# Patient Record
Sex: MALE | Race: WHITE | Employment: OTHER | URBAN - METROPOLITAN AREA
[De-identification: names, ages, dates, MRNs, and addresses within clinical notes are randomized per-mention and may not be internally consistent; named-entity substitution may affect disease eponyms.]

---

## 2017-01-26 ENCOUNTER — OFFICE VISIT (OUTPATIENT)
Dept: INTERNAL MEDICINE CLINIC | Age: 62
End: 2017-01-26

## 2017-01-26 VITALS
HEART RATE: 54 BPM | DIASTOLIC BLOOD PRESSURE: 83 MMHG | HEIGHT: 70 IN | TEMPERATURE: 97.5 F | OXYGEN SATURATION: 99 % | BODY MASS INDEX: 25.62 KG/M2 | SYSTOLIC BLOOD PRESSURE: 135 MMHG | WEIGHT: 179 LBS | RESPIRATION RATE: 18 BRPM

## 2017-01-26 DIAGNOSIS — J01.00 ACUTE NON-RECURRENT MAXILLARY SINUSITIS: ICD-10-CM

## 2017-01-26 DIAGNOSIS — Z11.59 NEED FOR HEPATITIS C SCREENING TEST: ICD-10-CM

## 2017-01-26 DIAGNOSIS — Z00.00 PREVENTATIVE HEALTH CARE: Primary | ICD-10-CM

## 2017-01-26 DIAGNOSIS — L57.8 SUN-DAMAGED SKIN: ICD-10-CM

## 2017-01-26 DIAGNOSIS — E78.00 PURE HYPERCHOLESTEROLEMIA: ICD-10-CM

## 2017-01-26 RX ORDER — AZITHROMYCIN 250 MG/1
TABLET, FILM COATED ORAL
Qty: 6 TAB | Refills: 0 | Status: SHIPPED | OUTPATIENT
Start: 2017-01-26 | End: 2017-02-07 | Stop reason: ALTCHOICE

## 2017-01-26 NOTE — MR AVS SNAPSHOT
Visit Information Date & Time Provider Department Dept. Phone Encounter #  
 1/26/2017  9:15 AM Lesly Elizabeth MD [facility] [address] [id number] Follow-up Instructions Return in about 1 year (around 1/26/2018). Upcoming Health Maintenance Date Due Hepatitis C Screening 1955 COLONOSCOPY 4/23/1973 ZOSTER VACCINE AGE 60> 4/23/2015 INFLUENZA AGE 9 TO ADULT 8/1/2016 DTaP/Tdap/Td series (2 - Td) 7/3/2023 Allergies as of 1/26/2017  Review Complete On: 1/26/2017 By: Lesly Elizabeth MD  
 No Known Allergies Current Immunizations  Reviewed on 1/26/2017 Name Date Influenza Vaccine 10/1/2016, 9/23/2015, 10/1/2014, 11/4/2013 Influenza Vaccine (Whole Virus) 10/1/2012 Tdap 7/3/2013 Zoster Vaccine, Live 6/1/2016 Reviewed by Lesly Elizabeth MD on 1/26/2017 at  9:43 AM  
 Reviewed by Lesly Elizabeth MD on 1/26/2017 at  9:47 AM  
You Were Diagnosed With   
  
 Codes Comments Preventative health care    -  Primary ICD-10-CM: Z00.00 ICD-9-CM: V70.0 Pure hypercholesterolemia     ICD-10-CM: E78.00 ICD-9-CM: 272.0 Need for hepatitis C screening test     ICD-10-CM: Z11.59 
ICD-9-CM: V73.89 Acute non-recurrent maxillary sinusitis     ICD-10-CM: J01.00 ICD-9-CM: 461.0 Vitals BP Pulse Temp Resp Height(growth percentile) Weight(growth percentile) 135/83 (BP 1 Location: Left arm, BP Patient Position: Sitting) (!) 54 97.5 °F (36.4 °C) (Oral) 18 5' 10\" (1.778 m) 179 lb (81.2 kg) SpO2 BMI Smoking Status 99% 25.68 kg/m2 Former Smoker Vitals History BMI and BSA Data Body Mass Index Body Surface Area  
 25.68 kg/m 2 2 m 2 Preferred Pharmacy Pharmacy Name Phone [id number] [phone] Your Updated Medication List  
  
   
 This list is accurate as of: 17  9:56 AM.  Always use your most recent med list.  
  
  
  
  
 atorvastatin 20 mg tablet Commonly known as:  LIPITOR Take 1 tablet by mouth  nightly  
  
 azithromycin 250 mg tablet Commonly known as:  [place] 2 tabs today then 1 daily for 4 more days CALCIUM-VITAMIN D3 PO Take  by mouth. 2 tablets daily [place] Generic drug:  ceramides 1,3,6-11  
by Apply Externally route. DYMISTA 137-50 mcg/spray [place] Generic drug:  azelastine-fluticasone FISH OIL 1,000 mg Cap Generic drug:  omega-3 fatty acids-vitamin e Take 1 Cap by mouth.  
  
 guaiFENesin 1,200 mg Ta12 ER tablet Commonly known as:  [address] Take  by mouth two (2) times a day. ibuprofen 200 mg tablet Commonly known as:  MOTRIN Take 600 mg by mouth daily as needed for Pain.  
  
 melatonin 5 mg Cap capsule Take 5 mg by mouth. As needed  
  
 montelukast 10 mg tablet Commonly known as:  SINGULAIR Take 1 tablet by mouth  daily MULTIPLE VITAMINS PO Take  by mouth. NexIUM 24HR 20 mg capsule Generic drug:  esomeprazole Take  by mouth daily. VITAMIN C 250 mg tablet Generic drug:  ascorbic acid (vitamin C) Take  by mouth. Prescriptions Sent to Pharmacy Refills  
 azithromycin (ZITHROMAX) 250 mg tablet 0 Si tabs today then 1 daily for 4 more days Class: Normal  
 Pharmacy: [facility]acy #[id number] [place] Ph #: [phone] We Performed the Following HEPATITIS C AB [66427 CPT(R)] LIPID PANEL [47875 CPT(R)] METABOLIC PANEL, BASIC [60954 CPT(R)] PSA DIAGNOSTIC (PROSTATIC SPECIFIC AG) [id number] CPT(R)] Follow-up Instructions Return in about 1 year (around 2018). Patient Instructions Saline Nasal Washes: Care Instructions Your Care Instructions Saline nasal washes help keep the nasal passages open by washing out thick or dried mucus. This simple remedy can help relieve symptoms of allergies, sinusitis, and colds. It also can make the nose feel more comfortable by keeping the mucous membranes moist. You may notice a little burning sensation in your nose the first few times you use the solution, but this usually gets better in a few days. Follow-up care is a key part of your treatment and safety. Be sure to make and go to all appointments, and call your doctor if you are having problems. It's also a good idea to know your test results and keep a list of the medicines you take. How can you care for yourself at home? · You can buy premixed saline solution in a squeeze bottle or other sinus rinse products at a drugstore. Read and follow the instructions on the label. · You also can make your own saline solution by adding 1 teaspoon of salt and 1 teaspoon of baking soda to 2 cups of distilled water. · If you use a homemade solution, pour a small amount into a clean bowl. Using a rubber bulb syringe, squeeze the syringe and place the tip in the salt water. Pull a small amount of the salt water into the syringe by relaxing your hand. · Sit down with your head tilted slightly back. Do not lie down. Put the tip of the bulb syringe or the squeeze bottle a little way into one of your nostrils. Gently drip or squirt a few drops into the nostril. Repeat with the other nostril. Some sneezing and gagging are normal at first. 
· Gently blow your nose. · Wipe the syringe or bottle tip clean after each use. · Repeat this 2 or 3 times a day. · Use nasal washes gently if you have nosebleeds often. When should you call for help? Watch closely for changes in your health, and be sure to contact your doctor if: 
· You often get nosebleeds. · You have problems doing the nasal washes. Where can you learn more? Go to http://josemanuel-cate.info/. Enter [id number] [phone] in the search box to learn more about \"Saline Nasal Washes: Care Instructions. \" Current as of: July 29, 2016 Content Version: 11.1 © 0505-2720 [employer]. Care instructions adapted under license by [employer] (which disclaims liability or warranty for this information). If you have questions about a medical condition or this instruction, always ask your healthcare professional. [place] [address] any warranty or liability for your use of this information. Sinusitis: Care Instructions Your Care Instructions Sinusitis is an infection of the lining of the sinus cavities in your head. Sinusitis often follows a cold. It causes pain and pressure in your head and face. In most cases, sinusitis gets better on its own in 1 to 2 weeks. But some mild symptoms may last for several weeks. Sometimes antibiotics are needed. Follow-up care is a key part of your treatment and safety. Be sure to make and go to all appointments, and call your doctor if you are having problems. It's also a good idea to know your test results and keep a list of the medicines you take. How can you care for yourself at home? · Take an over-the-counter pain medicine, such as acetaminophen (Tylenol), ibuprofen (Advil, Motrin), or naproxen (Aleve). Read and follow all instructions on the label. · If the doctor prescribed antibiotics, take them as directed. Do not stop taking them just because you feel better. You need to take the full course of antibiotics. · Be careful when taking over-the-counter cold or flu medicines and Tylenol at the same time. Many of these medicines have acetaminophen, which is Tylenol. Read the labels to make sure that you are not taking more than the recommended dose. Too much acetaminophen (Tylenol) can be harmful. · Breathe warm, moist air from a steamy shower, a hot bath, or a sink filled with hot water. Avoid cold, dry air.  Using a humidifier in your home may help. Follow the directions for cleaning the machine. · Use saline (saltwater) nasal washes to help keep your nasal passages open and wash out mucus and bacteria. You can buy saline nose drops at a grocery store or drugstore. Or you can make your own at home by adding 1 teaspoon of salt and 1 teaspoon of baking soda to 2 cups of distilled water. If you make your own, fill a bulb syringe with the solution, insert the tip into your nostril, and squeeze gently. Nyoka Mano your nose. · Put a hot, wet towel or a warm gel pack on your face 3 or 4 times a day for 5 to 10 minutes each time. · Try a decongestant nasal spray like oxymetazoline (Afrin). Do not use it for more than 3 days in a row. Using it for more than 3 days can make your congestion worse. When should you call for help? Call your doctor now or seek immediate medical care if: 
· You have new or worse swelling or redness in your face or around your eyes. · You have a new or higher fever. Watch closely for changes in your health, and be sure to contact your doctor if: 
· You have new or worse facial pain. · The mucus from your nose becomes thicker (like pus) or has new blood in it. · You are not getting better as expected. Where can you learn more? Go to http://josemaunel-cate.info/. Enter K178 in the search box to learn more about \"Sinusitis: Care Instructions. \" Current as of: July 29, 2016 Content Version: 11.1 © 3036-1169 [employer]. Care instructions adapted under license by [employer] (which disclaims liability or warranty for this information). If you have questions about a medical condition or this instruction, always ask your healthcare professional. [place] [address] any warranty or liability for your use of this information. Well Visit, Men 48 to 72: Care Instructions Your Care Instructions Physical exams can help you stay healthy.  Your doctor has checked your overall health and may have suggested ways to take good care of yourself. He or she also may have recommended tests. At home, you can help prevent illness with healthy eating, regular exercise, and other steps. Follow-up care is a key part of your treatment and safety. Be sure to make and go to all appointments, and call your doctor if you are having problems. It's also a good idea to know your test results and keep a list of the medicines you take. How can you care for yourself at home? · Reach and stay at a healthy weight. This will lower your risk for many problems, such as obesity, diabetes, heart disease, and high blood pressure. · Get at least 30 minutes of exercise on most days of the week. Walking is a good choice. You also may want to do other activities, such as running, swimming, cycling, or playing tennis or team sports. · Do not smoke. Smoking can make health problems worse. If you need help quitting, talk to your doctor about stop-smoking programs and medicines. These can increase your chances of quitting for good. · Protect your skin from too much sun. When you're outdoors from 10 a.m. to 4 p.m., stay in the shade or cover up with clothing and a hat with a wide brim. Wear sunglasses that block UV rays. Even when it's cloudy, put broad-spectrum sunscreen (SPF 30 or higher) on any exposed skin. · See a dentist one or two times a year for checkups and to have your teeth cleaned. · Wear a seat belt in the car. · Limit alcohol to 2 drinks a day. Too much alcohol can cause health problems. Follow your doctor's advice about when to have certain tests. These tests can spot problems early. · Cholesterol. Your doctor will tell you how often to have this done based on your overall health and other things that can increase your risk for heart attack and stroke. · Blood pressure.  Have your blood pressure checked during a routine doctor visit. Your doctor will tell you how often to check your blood pressure based on your age, your blood pressure results, and other factors. · Prostate exam. Talk to your doctor about whether you should have a blood test (called a PSA test) for prostate cancer. Experts disagree on whether men should have this test. Some experts recommend that you discuss the benefits and risks of the test with your doctor. · Diabetes. Ask your doctor whether you should have tests for diabetes. · Vision. Some experts recommend that you have yearly exams for glaucoma and other age-related eye problems starting at age 48. · Hearing. Tell your doctor if you notice any change in your hearing. You can have tests to find out how well you hear. · Colon cancer. You should begin tests for colon cancer at age 48. You may have one of several tests. Your doctor will tell you how often to have tests based on your age and risk. Risks include whether you already had a precancerous polyp removed from your colon or whether your parent, brother, sister, or child has had colon cancer. · Heart attack and stroke risk. At least every 4 to 6 years, you should have your risk for heart attack and stroke assessed. Your doctor uses factors such as your age, blood pressure, cholesterol, and whether you smoke or have diabetes to show what your risk for a heart attack or stroke is over the next 10 years. · Abdominal aortic aneurysm. Ask your doctor whether you should have a test to check for an aneurysm. You may need a test if you ever smoked or if your parent, brother, sister, or child has had an aneurysm. When should you call for help? Watch closely for changes in your health, and be sure to contact your doctor if you have any problems or symptoms that concern you. Where can you learn more? Go to http://josemanuel-cate.info/. Enter T207 in the search box to learn more about \"Well Visit, Men 48 to 72: Care Instructions. \" 
 Current as of: July 19, 2016 Content Version: 11.1 © 9335-0275 [employer], [employer]. Care instructions adapted under license by [employer] (which disclaims liability or warranty for this information). If you have questions about a medical condition or this instruction, always ask your healthcare professional. [address] any warranty or liability for your use of this information. Introducing [facility]! Dear Vinnie Andrade: 
Thank you for requesting a Giferent account. Our records indicate that you already have an active Giferent account. You can access your account anytime at [email or web address]. Tienda Nube / [email or web address] Did you know that you can access your hospital and ER discharge instructions at any time in Giferent? You can also review all of your test results from your hospital stay or ER visit. Additional Information If you have questions, please visit the Frequently Asked Questions section of the Giferent website at [email or web address] Remember, Giferent is NOT to be used for urgent needs. For medical emergencies, dial 911. Now available from your iPhone and Android! Please provide this summary of care documentation to your next provider. Your primary care clinician is listed as Elizabeth Leo. If you have any questions after today's visit, please call [phone].

## 2017-01-26 NOTE — PROGRESS NOTES
HISTORY OF PRESENT ILLNESS  Lisa Carmen is a 64 y.o. male. HPI  Lisa Carmen is here for complete health maintenance physical exam and screening. he does not have other concerns. Upper respiratory illness:  Lisa Carmen presents with complaints of congestion, sore throat, dry cough, hoarsness, and green nasal discharge for 2 weeks. no nausea and no vomiting . he has not had  fever and chills. Symptoms are mild. Over-the-counter remedies including mucinex   has been used with good relief of symptoms. Drinking plenty of fluids: yes  Asthma?:  no    Contacts with similar infections: yes         Hyperlipidemia:  Lisa Carmen is following up on his dyslipidemia. Cardiovascular risks for him are: LDL goal is under 100. Currently he takes Lipitor (atorvastatin) ,   Lab Results   Component Value Date/Time    Cholesterol, total 158 08/07/2015 08:42 AM    Cholesterol, total 231 07/03/2013 11:48 AM    HDL Cholesterol 66 08/07/2015 08:42 AM    HDL Cholesterol 46 07/03/2013 11:48 AM    LDL, calculated 76 08/07/2015 08:42 AM    LDL, calculated 157 07/03/2013 11:48 AM    Triglyceride 78 08/07/2015 08:42 AM    Triglyceride 139 07/03/2013 11:48 AM    Tryglycerides 111 02/07/2014     Lab Results   Component Value Date/Time    ALT 22 02/07/2014       Myalgias: no  Fatigue: no          Health maintenance hx includes:  Exercise: moderately active.   Form of exercise: elliptical, wts   Diet: generally follows a low fat low cholesterol diet, exercises sporadically    Cancer screening:    Colon cancer screening:  Last Colonoscopy: 2008 and was normal -per pt   Prostate cancer screening: PSA and/or BETO: 2015 and was normal       Lab Results   Component Value Date/Time    Cholesterol, total 158 08/07/2015 08:42 AM    HDL Cholesterol 66 08/07/2015 08:42 AM    LDL, calculated 76 08/07/2015 08:42 AM    VLDL, calculated 16 08/07/2015 08:42 AM    Triglyceride 78 08/07/2015 08:42 AM    Tryglycerides 111 02/07/2014       Lab Results   Component Value Date/Time    Glucose 103 08/07/2015 08:42 AM         Immunizations:     Immunization History   Administered Date(s) Administered    Influenza Vaccine 11/04/2013, 10/01/2014, 09/23/2015, 10/01/2016    Influenza Vaccine (Whole Virus) 10/01/2012    Tdap 07/03/2013    Zoster Vaccine, Live 06/01/2016      Immunization status: up to date and documented. Social History     Social History    Marital status:      Spouse name: N/A    Number of children: N/A    Years of education: N/A     Occupational History    Not on file. Social History Main Topics    Smoking status: Former Smoker     Packs/day: 0.25     Years: 10.00     Types: Cigarettes     Quit date: 8/9/2014    Smokeless tobacco: Never Used      Comment: E Cigarettes     Alcohol use 4.5 oz/week     3 Glasses of wine, 3 Cans of beer, 3 Shots of liquor per week    Drug use: Yes     Special: Marijuana    Sexual activity: Not Currently     Partners: Female     Other Topics Concern    Not on file     Social History Narrative     Past Surgical History   Procedure Laterality Date    Hx orthopaedic       R ring finger fracture, ORIF     Family History   Problem Relation Age of Onset    Elevated Lipids Mother     Hypertension Mother     Heart Disease Father     Dementia Father     Cancer Brother      Hodgkins Lymphoma    Diabetes Maternal Grandmother      Current Outpatient Prescriptions on File Prior to Visit   Medication Sig Dispense Refill    atorvastatin (LIPITOR) 20 mg tablet Take 1 tablet by mouth  nightly 90 Tab 0    guaiFENesin (MUCINEX) 1,200 mg Ta12 ER tablet Take  by mouth two (2) times a day.  0    ascorbic acid (VITAMIN C) 250 mg tablet Take  by mouth.  ceramides 1,3,6-11 (CERAVE) lotn by Apply Externally route.  ibuprofen (MOTRIN) 200 mg tablet Take 600 mg by mouth daily as needed for Pain.  melatonin (MELATONIN) 5 mg cap capsule Take 5 mg by mouth.  As needed      esomeprazole (NEXIUM 24HR) 20 mg capsule Take  by mouth daily.  DYMISTA 137-50 mcg/spray spry       CALCIUM-VITAMIN D3 PO Take  by mouth. 2 tablets daily      MULTIVITAMIN (MULTIPLE VITAMINS PO) Take  by mouth.  omega-3 fatty acids-vitamin e (FISH OIL) 1,000 mg cap Take 1 Cap by mouth.  montelukast (SINGULAIR) 10 mg tablet Take 1 tablet by mouth  daily 90 Tab 0     No current facility-administered medications on file prior to visit. .    Review of Systems   Constitutional: Negative for chills, fever, malaise/fatigue and weight loss. HENT: Positive for congestion. Negative for sore throat. Eyes: Negative for blurred vision and pain. Respiratory: Positive for cough. Negative for shortness of breath and wheezing. Cardiovascular: Negative for chest pain, palpitations and leg swelling. Gastrointestinal: Negative for constipation, diarrhea, heartburn, nausea and vomiting. Genitourinary: Negative for dysuria and hematuria. Musculoskeletal: Negative for back pain, joint pain and myalgias. Skin: Negative for rash. Neurological: Positive for headaches. Negative for dizziness. Psychiatric/Behavioral: Negative for depression. The patient is not nervous/anxious. Physical Exam   Constitutional: He is oriented to person, place, and time. He appears well-developed and well-nourished. No distress. /83 (BP 1 Location: Left arm, BP Patient Position: Sitting)  Pulse (!) 54  Temp 97.5 °F (36.4 °C) (Oral)   Resp 18  Ht 5' 10\" (1.778 m)  Wt 179 lb (81.2 kg)  SpO2 99%  BMI 25.68 kg/m2   HENT:   Head: Normocephalic and atraumatic. Right Ear: Hearing, tympanic membrane and ear canal normal. No decreased hearing is noted. Left Ear: Hearing, tympanic membrane and ear canal normal. No decreased hearing is noted. Nose: Mucosal edema and rhinorrhea present. No epistaxis. Right sinus exhibits no maxillary sinus tenderness and no frontal sinus tenderness.  Left sinus exhibits no maxillary sinus tenderness and no frontal sinus tenderness. Mouth/Throat: Uvula is midline and mucous membranes are normal. No oral lesions. Normal dentition. Posterior oropharyngeal erythema present. No oropharyngeal exudate, posterior oropharyngeal edema or tonsillar abscesses. Eyes: Conjunctivae and lids are normal. Pupils are equal, round, and reactive to light. Right eye exhibits no discharge. Left eye exhibits no discharge. No scleral icterus. Neck: Trachea normal. Neck supple. No thyromegaly present. Cardiovascular: Normal rate, regular rhythm, normal heart sounds, intact distal pulses and normal pulses. Exam reveals no gallop and no friction rub. No murmur heard. Pulmonary/Chest: Effort normal and breath sounds normal. No respiratory distress. Abdominal: Soft. Normal appearance and bowel sounds are normal. He exhibits no distension and no mass. There is no hepatosplenomegaly. There is no tenderness. There is no CVA tenderness. Musculoskeletal: Normal range of motion. He exhibits no edema or tenderness. Lymphadenopathy:     He has no cervical adenopathy. Right: No inguinal adenopathy present. Left: No inguinal adenopathy present. Neurological: He is alert and oriented to person, place, and time. Skin: Skin is warm and dry. No rash noted. He is not diaphoretic. Scattered erythematous patches c/w actinic keratosis on face, neck   Psychiatric: He has a normal mood and affect. His speech is normal and behavior is normal. Judgment and thought content normal. Cognition and memory are normal.   Nursing note and vitals reviewed. ASSESSMENT and PLAN  Anika Hansen was seen today for well male.     Diagnoses and all orders for this visit:    [facility]  he was advised to have follow up colonoscopy in 2018  Jojo Lai was counseled on age-appropriate/ guideline-based risk prevention behaviors and screening for a 64 y.o. year old   male . We also discussed adjustments in screening based on family history if necessary. Printed instructions for preventative screening guidelines and healthy behaviors given to patient with after visit summary. Pure hypercholesterolemia - recheck  -     LIPID PANEL    Need for hepatitis C screening test  -     HEPATITIS C AB    Acute non-recurrent maxillary sinusitis  Halle Duran was diagnosed with sinusitis. he is advised to drink plenty of water, use shower steam or humidifier to loosen secretions, saline nasal lavage 3 times daily and get plenty of rest.  he may use mucinex 1200mg twice daily, afrin nasal spray (2 sprays each nostril twice daily for up to 5 days) along with tylenol or advil as needed for fever and pain. Written instructions were given to the patient emphasizing these recommendations. -     azithromycin (ZITHROMAX) 250 mg tablet; 2 tabs today then 1 daily for 4 more days    Sun-damaged skin  -     REFERRAL TO DERMATOLOGY      Follow-up Disposition:  Return in about 1 year (around 1/26/2018).

## 2017-01-26 NOTE — PATIENT INSTRUCTIONS
Saline Nasal Washes: Care Instructions  Your Care Instructions  Saline nasal washes help keep the nasal passages open by washing out thick or dried mucus. This simple remedy can help relieve symptoms of allergies, sinusitis, and colds. It also can make the nose feel more comfortable by keeping the mucous membranes moist. You may notice a little burning sensation in your nose the first few times you use the solution, but this usually gets better in a few days. Follow-up care is a key part of your treatment and safety. Be sure to make and go to all appointments, and call your doctor if you are having problems. It's also a good idea to know your test results and keep a list of the medicines you take. How can you care for yourself at home? · You can buy premixed saline solution in a squeeze bottle or other sinus rinse products at a drugstore. Read and follow the instructions on the label. · You also can make your own saline solution by adding 1 teaspoon of salt and 1 teaspoon of baking soda to 2 cups of distilled water. · If you use a homemade solution, pour a small amount into a clean bowl. Using a rubber bulb syringe, squeeze the syringe and place the tip in the salt water. Pull a small amount of the salt water into the syringe by relaxing your hand. · Sit down with your head tilted slightly back. Do not lie down. Put the tip of the bulb syringe or the squeeze bottle a little way into one of your nostrils. Gently drip or squirt a few drops into the nostril. Repeat with the other nostril. Some sneezing and gagging are normal at first.  · Gently blow your nose. · Wipe the syringe or bottle tip clean after each use. · Repeat this 2 or 3 times a day. · Use nasal washes gently if you have nosebleeds often. When should you call for help? Watch closely for changes in your health, and be sure to contact your doctor if:  · You often get nosebleeds. · You have problems doing the nasal washes.   Where can you learn more? Go to http://josemanuel-cate.info/. Enter [id number] in the search box to learn more about \"Saline Nasal Washes: Care Instructions. \"  Current as of: July 29, 2016  Content Version: 11.1  © 4893-0285 [employer]. Care instructions adapted under license by [employer] (which disclaims liability or warranty for this information). If you have questions about a medical condition or this instruction, always ask your healthcare professional. [address] any warranty or liability for your use of this information. Sinusitis: Care Instructions  Your Care Instructions    Sinusitis is an infection of the lining of the sinus cavities in your head. Sinusitis often follows a cold. It causes pain and pressure in your head and face. In most cases, sinusitis gets better on its own in 1 to 2 weeks. But some mild symptoms may last for several weeks. Sometimes antibiotics are needed. Follow-up care is a key part of your treatment and safety. Be sure to make and go to all appointments, and call your doctor if you are having problems. It's also a good idea to know your test results and keep a list of the medicines you take. How can you care for yourself at home? · Take an over-the-counter pain medicine, such as acetaminophen (Tylenol), ibuprofen (Advil, Motrin), or naproxen (Aleve). Read and follow all instructions on the label. · If the doctor prescribed antibiotics, take them as directed. Do not stop taking them just because you feel better. You need to take the full course of antibiotics. · Be careful when taking over-the-counter cold or flu medicines and Tylenol at the same time. Many of these medicines have acetaminophen, which is Tylenol. Read the labels to make sure that you are not taking more than the recommended dose. Too much acetaminophen (Tylenol) can be harmful.   · Breathe warm, moist air from a steamy shower, a hot bath, or a sink filled with hot water. Avoid cold, dry air. Using a humidifier in your home may help. Follow the directions for cleaning the machine. · Use saline (saltwater) nasal washes to help keep your nasal passages open and wash out mucus and bacteria. You can buy saline nose drops at a grocery store or drugstore. Or you can make your own at home by adding 1 teaspoon of salt and 1 teaspoon of baking soda to 2 cups of distilled water. If you make your own, fill a bulb syringe with the solution, insert the tip into your nostril, and squeeze gently. Tabatha Pickup your nose. · Put a hot, wet towel or a warm gel pack on your face 3 or 4 times a day for 5 to 10 minutes each time. · Try a decongestant nasal spray like oxymetazoline (Afrin). Do not use it for more than 3 days in a row. Using it for more than 3 days can make your congestion worse. When should you call for help? Call your doctor now or seek immediate medical care if:  · You have new or worse swelling or redness in your face or around your eyes. · You have a new or higher fever. Watch closely for changes in your health, and be sure to contact your doctor if:  · You have new or worse facial pain. · The mucus from your nose becomes thicker (like pus) or has new blood in it. · You are not getting better as expected. Where can you learn more? Go to http://josemanuel-cate.info/. Enter U904 in the search box to learn more about \"Sinusitis: Care Instructions. \"  Current as of: July 29, 2016  Content Version: 11.1  © 1403-7187 [employer]. Care instructions adapted under license by [employer] (which disclaims liability or warranty for this information). If you have questions about a medical condition or this instruction, always ask your healthcare professional. [place] [address] any warranty or liability for your use of this information.        Well Visit, Men 48 to 72: Care Instructions  Your Care Instructions  Physical exams can help you stay healthy. Your doctor has checked your overall health and may have suggested ways to take good care of yourself. He or she also may have recommended tests. At home, you can help prevent illness with healthy eating, regular exercise, and other steps. Follow-up care is a key part of your treatment and safety. Be sure to make and go to all appointments, and call your doctor if you are having problems. It's also a good idea to know your test results and keep a list of the medicines you take. How can you care for yourself at home? · Reach and stay at a healthy weight. This will lower your risk for many problems, such as obesity, diabetes, heart disease, and high blood pressure. · Get at least 30 minutes of exercise on most days of the week. Walking is a good choice. You also may want to do other activities, such as running, swimming, cycling, or playing tennis or team sports. · Do not smoke. Smoking can make health problems worse. If you need help quitting, talk to your doctor about stop-smoking programs and medicines. These can increase your chances of quitting for good. · Protect your skin from too much sun. When you're outdoors from 10 a.m. to 4 p.m., stay in the shade or cover up with clothing and a hat with a wide brim. Wear sunglasses that block UV rays. Even when it's cloudy, put broad-spectrum sunscreen (SPF 30 or higher) on any exposed skin. · See a dentist one or two times a year for checkups and to have your teeth cleaned. · Wear a seat belt in the car. · Limit alcohol to 2 drinks a day. Too much alcohol can cause health problems. Follow your doctor's advice about when to have certain tests. These tests can spot problems early. · Cholesterol. Your doctor will tell you how often to have this done based on your overall health and other things that can increase your risk for heart attack and stroke. · Blood pressure. Have your blood pressure checked during a routine doctor visit.  Your doctor will tell you how often to check your blood pressure based on your age, your blood pressure results, and other factors. · Prostate exam. Talk to your doctor about whether you should have a blood test (called a PSA test) for prostate cancer. Experts disagree on whether men should have this test. Some experts recommend that you discuss the benefits and risks of the test with your doctor. · Diabetes. Ask your doctor whether you should have tests for diabetes. · Vision. Some experts recommend that you have yearly exams for glaucoma and other age-related eye problems starting at age 48. · Hearing. Tell your doctor if you notice any change in your hearing. You can have tests to find out how well you hear. · Colon cancer. You should begin tests for colon cancer at age 48. You may have one of several tests. Your doctor will tell you how often to have tests based on your age and risk. Risks include whether you already had a precancerous polyp removed from your colon or whether your parent, brother, sister, or child has had colon cancer. · Heart attack and stroke risk. At least every 4 to 6 years, you should have your risk for heart attack and stroke assessed. Your doctor uses factors such as your age, blood pressure, cholesterol, and whether you smoke or have diabetes to show what your risk for a heart attack or stroke is over the next 10 years. · Abdominal aortic aneurysm. Ask your doctor whether you should have a test to check for an aneurysm. You may need a test if you ever smoked or if your parent, brother, sister, or child has had an aneurysm. When should you call for help? Watch closely for changes in your health, and be sure to contact your doctor if you have any problems or symptoms that concern you. Where can you learn more? Go to http://josemanuel-cate.info/. Enter C978 in the search box to learn more about \"Well Visit, Men 48 to 72: Care Instructions. \"  Current as of: July 19, 2016  Content Version: 11.1  © 8375-0776 [employer]. Care instructions adapted under license by [employer] (which disclaims liability or warranty for this information). If you have questions about a medical condition or this instruction, always ask your healthcare professional. [address] any warranty or liability for your use of this information.

## 2017-01-27 LAB
BUN SERPL-MCNC: 15 MG/DL (ref 8–27)
BUN/CREAT SERPL: 14 (ref 10–22)
CALCIUM SERPL-MCNC: 9.5 MG/DL (ref 8.6–10.2)
CHLORIDE SERPL-SCNC: 96 MMOL/L (ref 96–106)
CHOLEST SERPL-MCNC: 141 MG/DL (ref 100–199)
CO2 SERPL-SCNC: 26 MMOL/L (ref 18–29)
CREAT SERPL-MCNC: 1.07 MG/DL (ref 0.76–1.27)
GLUCOSE SERPL-MCNC: 94 MG/DL (ref 65–99)
HCV AB S/CO SERPL IA: <0.1 S/CO RATIO (ref 0–0.9)
HDLC SERPL-MCNC: 60 MG/DL
INTERPRETATION, 910389: NORMAL
LDLC SERPL CALC-MCNC: 69 MG/DL (ref 0–99)
POTASSIUM SERPL-SCNC: 4.6 MMOL/L (ref 3.5–5.2)
PSA SERPL-MCNC: 3 NG/ML (ref 0–4)
SODIUM SERPL-SCNC: 136 MMOL/L (ref 134–144)
TRIGL SERPL-MCNC: 62 MG/DL (ref 0–149)
VLDLC SERPL CALC-MCNC: 12 MG/DL (ref 5–40)

## 2017-02-07 ENCOUNTER — TELEPHONE (OUTPATIENT)
Dept: INTERNAL MEDICINE CLINIC | Age: 62
End: 2017-02-07

## 2017-02-07 RX ORDER — LEVOFLOXACIN 500 MG/1
500 TABLET, FILM COATED ORAL DAILY
Qty: 10 TAB | Refills: 0 | Status: SHIPPED | OUTPATIENT
Start: 2017-02-07 | End: 2017-06-27 | Stop reason: ALTCHOICE

## 2017-02-07 NOTE — TELEPHONE ENCOUNTER
Ongoing sinusitis. Will change to levaquin 500mg daily for 10 days. New medication or Refill was escribed to patient's pharmacy as requested. Please let him know. Follow up here if not improving with that.

## 2017-02-07 NOTE — TELEPHONE ENCOUNTER
Patient informed of new prescription that was sent to local pharmacy. Patient is to let us know if not better in 12 days.

## 2017-02-07 NOTE — TELEPHONE ENCOUNTER
----- Message from Nya Berry LPN sent at 9/2/9647 12:26 PM EST -----  Regarding: FW: Visit Follow-Up Question  Contact: [phone]  Refill/new prescription request.    ----- Message -----     From: Terrence Mahan     Sent: 2/7/2017  10:45 AM       To: [facility]  Subject: Visit Follow-Up Question                         Dr. Jessica Jacob prescribed the 5-day  Z-pac for me at my last visit on January 26th since I had head and chest congestion for over two weeks, symptoms started on January 11th. I took the last of the five Z-pac pills on January 30th. As of today, over a week sine the last pill, I still have the head and chest congestion. Is there another prescription I could have to knock this thing out, perhaps another dose of the z-pac? .  I have now had this for almost 4 weeks.       Thanks

## 2017-02-24 ENCOUNTER — TELEPHONE (OUTPATIENT)
Dept: INTERNAL MEDICINE CLINIC | Age: 62
End: 2017-02-24

## 2017-02-24 RX ORDER — SILDENAFIL 50 MG/1
50 TABLET, FILM COATED ORAL AS NEEDED
Qty: 8 TAB | Refills: 5 | Status: SHIPPED | OUTPATIENT
Start: 2017-02-24 | End: 2017-06-14 | Stop reason: SDUPTHER

## 2017-02-24 NOTE — TELEPHONE ENCOUNTER
I have attempted without success to contact this patient by phone to return their call and I left a message on answering machine.

## 2017-02-24 NOTE — TELEPHONE ENCOUNTER
----- Message from Magi James LPN sent at 0/57/0559  7:33 AM EST -----  Regarding: FW: Visit Follow-Up Question  Contact: [phone]  Patient requesting a new medication.   ----- Message -----     From: Chapo Patel     Sent: 2/23/2017   4:10 PM       To: [facility]  Subject: Visit [facility] Dr. Mireya Dumont,    I had two items to mention;    First, I finished the last of the second round of antibiotics on Saturday, February 18th. I was instructed to contact you two days after to report on how I am doing. I do feel somewhat better although I still have some congestion in my ears and my eyes are puffy. I have continued to use the Nasal Rinse and Afrin twice daily. What do you recommend at this point? Second and more awkwardly and embarrassingly, I meant to mention an additional item this to Dr. Mireya Dumont at my annual exam.  It has been 28 years since I have been in the dating scene and I think I may need a little assistance in the form of low-dose Viagra. Let me know what you think and if OK could you send to the my pharmacy.

## 2017-02-24 NOTE — TELEPHONE ENCOUNTER
I spoke to pt. Nasal congestion, clear drainage improving but not resolved. The patient denies fevers, chills or sweats. No cough. Can change zyrtec to zyrtec D for better decongestion. Continue flonase, singulair as this is  Likely allergy related. Erectile Dysfunction:  Floyd Duran reports sexual dysfunction developing over recently after returning to sexual activity . He reports difficulty maintaining erections with intercourse. His libido is Normal. He has had a history of testicular trauma, surgery or infections. PDE5 inhibitor has not been tried. He does wish to try medication for this. Will try viagra. The patient desires Viagra to treat his erectile dysfunction. History and physical exam has not disclosed any other obvious treatable cause of this complaint. He is informed that Viagra is sometimes not covered by insurance. It is available on a fee-for-service cost basis, and is relatively expensive. He can start with 50 mg dose, and increase to 100 mg if necessary. The method of use 15-30 minutes prior to anticipated intercourse is explained. He should not use any more than one tablet in a 24 hour period. The patient is not taking nitrates, and denies he has access to nitrates in any form at any time. The side effects of possible headache, flushing, dyspepsia and transient changes in vision have been explained. Rarely prolonged painful erection can occur and he was told to go to ER immediately if that were to occur.

## 2017-02-24 NOTE — TELEPHONE ENCOUNTER
----- Message from Maria Alejandra Flood LPN sent at 9/59/3743  7:33 AM EST -----  Regarding: FW: Visit Follow-Up Question  Contact: [phone]  Patient requesting a new medication.   ----- Message -----     From: Valeriy Fortune     Sent: 2/23/2017   4:10 PM       To: [facility]  Subject: Visit [facility] Dr. Nuris Becker,    I had two items to mention;    First, I finished the last of the second round of antibiotics on Saturday, February 18th. I was instructed to contact you two days after to report on how I am doing. I do feel somewhat better although I still have some congestion in my ears and my eyes are puffy. I have continued to use the Nasal Rinse and Afrin twice daily. What do you recommend at this point? Second and more awkwardly and embarrassingly, I meant to mention an additional item this to Dr. Nuris Becker at my annual exam.  It has been 28 years since I have been in the dating scene and I think I may need a little assistance in the form of low-dose Viagra. Let me know what you think and if OK could you send to the my pharmacy.

## 2017-06-19 RX ORDER — SILDENAFIL 50 MG/1
20 TABLET, FILM COATED ORAL AS NEEDED
Qty: 30 TAB | Refills: 1 | Status: CANCELLED | OUTPATIENT
Start: 2017-06-19

## 2017-06-19 NOTE — TELEPHONE ENCOUNTER
[facility] requires a new Rx on file for Sildenafil 20 mg. Patient  script with a coupon card and paid out of packet for 90 tablets.  [facility] # [id number] contact [phone]

## 2017-06-21 NOTE — TELEPHONE ENCOUNTER
----- Message from Vangie Severin sent at 6/21/2017  1:20 PM EDT -----  Regarding: Dr. Vitale/prescription request  Pt needs the prescription for Sildenafil changed to 20mg quantity of 90 tablets supply. Please all the prescription into the [facility] pharmacy [phone]. Pt can be reached at [phone].

## 2017-06-22 RX ORDER — SILDENAFIL 50 MG/1
50 TABLET, FILM COATED ORAL AS NEEDED
Qty: 90 TAB | Refills: 0 | Status: CANCELLED | OUTPATIENT
Start: 2017-06-22

## 2017-06-23 RX ORDER — SILDENAFIL CITRATE 20 MG/1
60 TABLET ORAL
Qty: 90 TAB | Refills: 2 | Status: SHIPPED | OUTPATIENT
Start: 2017-06-23

## 2017-06-27 ENCOUNTER — OFFICE VISIT (OUTPATIENT)
Dept: INTERNAL MEDICINE CLINIC | Age: 62
End: 2017-06-27

## 2017-06-27 VITALS
WEIGHT: 177 LBS | RESPIRATION RATE: 16 BRPM | OXYGEN SATURATION: 98 % | HEIGHT: 70 IN | DIASTOLIC BLOOD PRESSURE: 87 MMHG | SYSTOLIC BLOOD PRESSURE: 154 MMHG | BODY MASS INDEX: 25.34 KG/M2 | TEMPERATURE: 98.2 F | HEART RATE: 64 BPM

## 2017-06-27 DIAGNOSIS — B00.1 FEVER BLISTER: Primary | ICD-10-CM

## 2017-06-27 DIAGNOSIS — Z11.3 SCREENING FOR STD (SEXUALLY TRANSMITTED DISEASE): ICD-10-CM

## 2017-06-27 DIAGNOSIS — B00.1 FEVER BLISTER: ICD-10-CM

## 2017-06-27 RX ORDER — SILDENAFIL CITRATE 50 MG/1
TABLET, FILM COATED ORAL
COMMUNITY
Start: 2017-06-17 | End: 2017-06-27 | Stop reason: SDUPTHER

## 2017-06-27 RX ORDER — LANOLIN ALCOHOL/MO/W.PET/CERES
1000 CREAM (GRAM) TOPICAL DAILY
COMMUNITY
End: 2022-07-26 | Stop reason: ALTCHOICE

## 2017-06-27 RX ORDER — VALACYCLOVIR HYDROCHLORIDE 1 G/1
1000 TABLET, FILM COATED ORAL 2 TIMES DAILY
Qty: 6 TAB | Refills: 0 | Status: SHIPPED | OUTPATIENT
Start: 2017-06-27 | End: 2017-06-29 | Stop reason: SDUPTHER

## 2017-06-27 RX ORDER — ZINC GLUCONATE 10 MG
LOZENGE ORAL
COMMUNITY
End: 2022-07-26 | Stop reason: ALTCHOICE

## 2017-06-27 NOTE — MR AVS SNAPSHOT
Visit Information Date & Time Provider Department Dept. Phone Encounter #  
 6/27/2017  3:00 PM Mattie Aleman MD [facility] [address] [id number] Upcoming Health Maintenance Date Due COLONOSCOPY 4/23/1973 INFLUENZA AGE 9 TO ADULT 8/1/2017 DTaP/Tdap/Td series (2 - Td) 7/3/2023 Allergies as of 6/27/2017  Review Complete On: 5/56/9188 By: Aurelia Lace Wears No Known Allergies Current Immunizations  Reviewed on 1/26/2017 Name Date Influenza Vaccine 10/1/2016, 9/23/2015, 10/1/2014, 11/4/2013 Influenza Vaccine (Whole Virus) 10/1/2012 Tdap 7/3/2013 Zoster Vaccine, Live 6/1/2016 Not reviewed this visit You Were Diagnosed With   
  
 Codes Comments Fever blister    -  Primary ICD-10-CM: B00.1 ICD-9-CM: 054.9 Screening for STD (sexually transmitted disease)     ICD-10-CM: Z11.3 ICD-9-CM: V74.5 Vitals BP Pulse Temp Resp Height(growth percentile) Weight(growth percentile) 154/87 (BP 1 Location: Left arm, BP Patient Position: Sitting) 64 98.2 °F (36.8 °C) (Oral) 16 5' 10\" (1.778 m) 177 lb (80.3 kg) SpO2 BMI Smoking Status 98% 25.4 kg/m2 Former Smoker Vitals History BMI and BSA Data Body Mass Index Body Surface Area  
 25.4 kg/m 2 1.99 m 2 Preferred Pharmacy Pharmacy Name Phone [id number]- [place], [address] [phone] Your Updated Medication List  
  
   
This list is accurate as of: 6/27/17  3:38 PM.  Always use your most recent med list.  
  
  
  
  
 atorvastatin 20 mg tablet Commonly known as:  LIPITOR Take 1 tablet by mouth  nightly CALCIUM-VITAMIN D3 PO Take  by mouth. 2 tablets daily Limmie Harlan Generic drug:  ceramides 1,3,6-11  
by Apply Externally route. cyanocobalamin 1,000 mcg tablet Take 1,000 mcg by mouth daily. DYMISTA 137-50 mcg/spray Spry Generic drug:  azelastine-fluticasone FISH OIL 1,000 mg Cap Generic drug:  omega-3 fatty acids-vitamin e Take 1 Cap by mouth.  
  
 guaiFENesin 1,200 mg Ta12 ER tablet Commonly known as:  [address] Take  by mouth two (2) times a day. ibuprofen 200 mg tablet Commonly known as:  MOTRIN Take 600 mg by mouth daily as needed for Pain.  
  
 levoFLOXacin 500 mg tablet Commonly known as:  Marrianne Ogles Take 1 Tab by mouth daily. magnesium 250 mg Tab Take  by mouth.  
  
 melatonin 5 mg Cap capsule Take 5 mg by mouth. As needed  
  
 montelukast 10 mg tablet Commonly known as:  SINGULAIR Take 1 tablet by mouth  daily MULTIPLE VITAMINS PO Take  by mouth. NexIUM 24HR 20 mg capsule Generic drug:  esomeprazole Take  by mouth daily. sildenafil 20 mg tablet Commonly known as:  REVATIO Take 3 Tabs by mouth daily as needed. valACYclovir 1 gram tablet Commonly known as:  VALTREX Take 1 Tab by mouth two (2) times a day for 3 days. VITAMIN C 250 mg tablet Generic drug:  ascorbic acid (vitamin C) Take  by mouth. Prescriptions Sent to Pharmacy Refills  
 valACYclovir (VALTREX) 1 gram tablet 0 Sig: Take 1 Tab by mouth two (2) times a day for 3 days. Class: Normal  
 Pharmacy: [facility]/pharmacy #[id number] Ph #: [phone] Route: Oral  
  
We Performed the Following HIV 1/2 AG/AB, 4TH GENERATION,W RFLX CONFIRM [[id number] Custom] HSV 1 AND 2 ABS, IGM [[id number] Custom] RPR [30176 CPT(R)] To-Do List   
 06/27/2017 Lab:  HERPES SIMPLEX VIRUS TYPES 1/2 SPECIFIC AB, IGG Introducing [facility]! Dear Min Short: 
Thank you for requesting a [employer] account. Our records indicate that you already have an active [employer] account. You can access your account anytime at [email or web address] Did you know that you can access your hospital and ER discharge instructions at any time in [employer]? You can also review all of your test results from your hospital stay or ER visit. Additional Information If you have questions, please visit the Frequently Asked Questions section of the [employer] website at [email or web address]. Pure life renal/[employer]/. Remember, [employer] is NOT to be used for urgent needs. For medical emergencies, dial 911. Now available from your iPhone and Android! Please provide this summary of care documentation to your next provider. Your primary care clinician is listed as Kayleigh Abbott. If you have any questions after today's visit, please call [phone].

## 2017-06-27 NOTE — PROGRESS NOTES
Ricardo Whipple is a 58 y.o. male who presents today for Mouth Lesions  . He has a history of   Patient Active Problem List   Diagnosis Code    GERD (gastroesophageal reflux disease) K21.9    Tinnitus, bilateral H93.13    Low back pain M54.5    Hyperlipidemia E78.5    Prediabetes R73.03   . Today patient is here for an acute visit. Problem visit:  Ricardo Whipple is here for complaint of lesion on mouth and R side of face. Problem began 1 week(s) ago. Severity is moderate  Character of problem: rash on the R side face/lip. Pt notes recently  that had sexual relations with a new partner and provided oral sex. This was a week before the outbreak. No fevers/chills. No genital lesions. Associated symptoms include: mild dyscomfort, notes ? A small lymph node. Treatments tried include: abx ointment. ROS  Review of Systems   Constitutional: Negative for chills, fever and weight loss. HENT: Negative for congestion and sore throat. Eyes: Negative for blurred vision, double vision and photophobia. Respiratory: Negative for cough and shortness of breath. Cardiovascular: Negative for chest pain, palpitations and leg swelling. Gastrointestinal: Negative for abdominal pain, constipation, diarrhea, heartburn, nausea and vomiting. Genitourinary: Negative for dysuria, frequency, hematuria and urgency. Musculoskeletal: Negative for joint pain and myalgias. Skin: Positive for rash. Neurological: Negative. Negative for headaches. Endo/Heme/Allergies: Does not bruise/bleed easily. Psychiatric/Behavioral: Negative for memory loss and suicidal ideas. Visit Vitals    /87 (BP 1 Location: Left arm, BP Patient Position: Sitting)    Pulse 64    Temp 98.2 °F (36.8 °C) (Oral)    Resp 16    Ht 5' 10\" (1.778 m)    Wt 177 lb (80.3 kg)    SpO2 98%    BMI 25.4 kg/m2       Physical Exam   Constitutional: He is oriented to person, place, and time.  He appears well-developed and well-nourished. HENT:   Head: Normocephalic and atraumatic. Small lesions. Consistent with herpes lesions. Mouth clear. No drainage. Cardiovascular: Normal rate and regular rhythm. Pulmonary/Chest: Effort normal. No respiratory distress. Neurological: He is alert and oriented to person, place, and time. Skin: Skin is warm and dry. Psychiatric: He has a normal mood and affect. Slightly anxious         Current Outpatient Prescriptions   Medication Sig    magnesium 250 mg tab Take  by mouth.  cyanocobalamin 1,000 mcg tablet Take 1,000 mcg by mouth daily.  valACYclovir (VALTREX) 1 gram tablet Take 1 Tab by mouth two (2) times a day for 3 days.  sildenafil (REVATIO) 20 mg tablet Take 3 Tabs by mouth daily as needed.  atorvastatin (LIPITOR) 20 mg tablet Take 1 tablet by mouth  nightly    montelukast (SINGULAIR) 10 mg tablet Take 1 tablet by mouth  daily    ascorbic acid (VITAMIN C) 250 mg tablet Take  by mouth.  ibuprofen (MOTRIN) 200 mg tablet Take 600 mg by mouth daily as needed for Pain.  esomeprazole (NEXIUM 24HR) 20 mg capsule Take  by mouth daily.  DYMISTA 137-50 mcg/spray spry     CALCIUM-VITAMIN D3 PO Take  by mouth. 2 tablets daily    MULTIVITAMIN (MULTIPLE VITAMINS PO) Take  by mouth.  omega-3 fatty acids-vitamin e (FISH OIL) 1,000 mg cap Take 1 Cap by mouth.  levoFLOXacin (LEVAQUIN) 500 mg tablet Take 1 Tab by mouth daily.  guaiFENesin (MUCINEX) 1,200 mg Ta12 ER tablet Take  by mouth two (2) times a day.  ceramides 1,3,6-11 (CERAVE) lotn by Apply Externally route.  melatonin (MELATONIN) 5 mg cap capsule Take 5 mg by mouth. As needed     No current facility-administered medications for this visit.          Past Medical History:   Diagnosis Date    Calculus of kidney     Chronic pain     GERD (gastroesophageal reflux disease)       Past Surgical History:   Procedure Laterality Date    HX ORTHOPAEDIC      R ring finger fracture, [facility]      Social History   Substance Use Topics    Smoking status: Former Smoker     Packs/day: 0.25     Years: 10.00     Types: Cigarettes     Quit date: 8/9/2014    Smokeless tobacco: Never Used      Comment: E Cigarettes     Alcohol use 4.5 oz/week     3 Glasses of wine, 3 Cans of beer, 3 Shots of liquor per week      Family History   Problem Relation Age of Onset    Elevated Lipids Mother     Hypertension Mother     Heart Disease Father     Dementia Father     Cancer Brother      Hodgkins Lymphoma    Diabetes Maternal Grandmother         No Known Allergies     Assessment/Plan  Monroe Dean was seen today for mouth lesions. Diagnoses and all orders for this visit:    Fever blister - given new outbreak, treat with valtrex. Test IgM/G for herpes. Also screen for HIV RPR.   -     valACYclovir (VALTREX) 1 gram tablet;  Take 1 Tab by mouth two (2) times a day for 3 days.  -     HERPES SIMPLEX VIRUS TYPES 1/2 SPECIFIC AB, IGG; Future  -     HSV 1 AND 2 ABS, IGM    Screening for STD (sexually transmitted disease)  -     RPR  -     HIV 1/2 AG/AB, 4TH GENERATION,W RFLX CONFIRM        Follow-up Disposition: Not on File    Christi Gibson MD  6/27/2017

## 2017-06-28 LAB
HIV 1+2 AB+HIV1 P24 AG SERPL QL IA: NON REACTIVE
HSV1 IGG SER IA-ACNC: <0.91 INDEX (ref 0–0.9)
HSV1 IGM TITR SER IF: NORMAL TITER
HSV2 IGG SER IA-ACNC: <0.91 INDEX (ref 0–0.9)
HSV2 IGM TITR SER IF: NORMAL TITER
RPR SER QL: NON REACTIVE

## 2017-06-29 DIAGNOSIS — B00.1 FEVER BLISTER: ICD-10-CM

## 2017-06-29 RX ORDER — VALACYCLOVIR HYDROCHLORIDE 1 G/1
1000 TABLET, FILM COATED ORAL 3 TIMES DAILY
Qty: 12 TAB | Refills: 0 | Status: SHIPPED | OUTPATIENT
Start: 2017-06-29 | End: 2022-03-09 | Stop reason: SDUPTHER

## 2017-10-17 RX ORDER — ATORVASTATIN CALCIUM 20 MG/1
TABLET, FILM COATED ORAL
Qty: 90 TAB | Refills: 2 | Status: SHIPPED | OUTPATIENT
Start: 2017-10-17 | End: 2018-07-06 | Stop reason: SDUPTHER

## 2018-01-30 ENCOUNTER — OFFICE VISIT (OUTPATIENT)
Dept: INTERNAL MEDICINE CLINIC | Age: 63
End: 2018-01-30

## 2018-01-30 VITALS
SYSTOLIC BLOOD PRESSURE: 130 MMHG | WEIGHT: 186.25 LBS | RESPIRATION RATE: 18 BRPM | BODY MASS INDEX: 26.66 KG/M2 | HEART RATE: 52 BPM | OXYGEN SATURATION: 100 % | DIASTOLIC BLOOD PRESSURE: 80 MMHG | TEMPERATURE: 97.7 F | HEIGHT: 70 IN

## 2018-01-30 DIAGNOSIS — E78.00 PURE HYPERCHOLESTEROLEMIA: ICD-10-CM

## 2018-01-30 DIAGNOSIS — Z00.00 PREVENTATIVE HEALTH CARE: Primary | ICD-10-CM

## 2018-01-30 DIAGNOSIS — Z12.11 COLON CANCER SCREENING: ICD-10-CM

## 2018-01-30 DIAGNOSIS — R73.03 PREDIABETES: ICD-10-CM

## 2018-01-30 NOTE — PATIENT INSTRUCTIONS

## 2018-01-30 NOTE — MR AVS SNAPSHOT
[address] [facility], [place], operated by [facility] 
 
 
 [address] [facility] [address] [facility] 
[phone] Patient: Cori Cornejo MRN: [id number] RIQ:[id number] Visit Information Date & Time Provider Department Dept. Phone Encounter #  
 1/30/2018  9:15 AM Hang Sim MD [facility] [address] [facility] [id number] Follow-up Instructions Return in about 1 year (around 1/30/2019). Upcoming Health Maintenance Date Due COLONOSCOPY 4/23/1973 Influenza Age 5 to Adult 8/1/2017 DTaP/Tdap/Td series (2 - Td) 7/3/2023 Allergies as of 1/30/2018  Review Complete On: 1/30/2018 By: Vahe Delgado LPN No Known Allergies Current Immunizations  Reviewed on 1/30/2018 Name Date Influenza Vaccine 10/1/2017, 10/1/2016, 9/23/2015, 10/1/2014, 11/4/2013 Influenza Vaccine (Whole Virus) 10/1/2012 Tdap 7/3/2013 Zoster Vaccine, Live 6/1/2016 Reviewed by Hang Sim MD on 1/30/2018 at  9:46 AM  
 Reviewed by Hang Sim MD on 1/30/2018 at  9:46 AM  
You Were Diagnosed With   
  
 Codes Comments Preventative health care    -  Primary ICD-10-CM: Z00.00 ICD-9-CM: V70.0 Prediabetes     ICD-10-CM: R73.03 
ICD-9-CM: 790.29 Pure hypercholesterolemia     ICD-10-CM: E78.00 ICD-9-CM: 272.0 Colon cancer screening     ICD-10-CM: Z12.11 ICD-9-CM: V76.51 Vitals BP Pulse Temp Resp Height(growth percentile) Weight(growth percentile) 130/80 (!) 52 97.7 °F (36.5 °C) (Oral) 18 5' 10\" (1.778 m) 186 lb 4 oz (84.5 kg) SpO2 BMI Smoking Status 100% 26.72 kg/m2 Current Every Day Smoker Vitals History BMI and BSA Data Body Mass Index Body Surface Area  
 26.72 kg/m 2 2.04 m 2 Preferred Pharmacy Pharmacy Name Phone [id number], [address] [phone] Your Updated Medication List  
  
   
 This list is accurate as of: 1/30/18 10:31 AM.  Always use your most recent med list.  
  
  
  
  
 atorvastatin 20 mg tablet Commonly known as:  LIPITOR  
TAKE 1 TABLET BY MOUTH  NIGHTLY CALCIUM-VITAMIN D3 PO Take  by mouth. 2 tablets daily  
  
 cyanocobalamin 1,000 mcg tablet Take 1,000 mcg by mouth daily. FISH OIL 1,000 mg Cap Generic drug:  omega-3 fatty acids-vitamin e Take 1 Cap by mouth. ibuprofen 200 mg tablet Commonly known as:  MOTRIN Take 600 mg by mouth daily as needed for Pain.  
  
 magnesium 250 mg Tab Take  by mouth.  
  
 montelukast 10 mg tablet Commonly known as:  SINGULAIR  
TAKE 1 TABLET BY MOUTH  DAILY MULTIPLE VITAMINS PO Take  by mouth. NexIUM 24HR 20 mg capsule Generic drug:  esomeprazole Take  by mouth daily. sildenafil (antihypertensive) 20 mg tablet Commonly known as:  REVATIO Take 3 Tabs by mouth daily as needed. VITAMIN C 250 mg tablet Generic drug:  ascorbic acid (vitamin C) Take  by mouth. We Performed the Following HEMOGLOBIN A1C WITH EAG [67616 CPT(R)] LIPID PANEL [62538 CPT(R)] METABOLIC PANEL, BASIC [33556 CPT(R)] PSA, DIAGNOSTIC (PROSTATE SPECIFIC AG) [id number] CPT(R)] REFERRAL TO GASTROENTEROLOGY [GCN09 Custom] Follow-up Instructions Return in about 1 year (around 1/30/2019). Referral Information Referral ID Referred By Referred To  
  
 [id number] [facility], [address] [place] [address]  [place], [address] [place] [address] Visits Status Start Date End Date 1 New Request 1/30/18 1/30/19 If your referral has a status of pending review or denied, additional information will be sent to support the outcome of this decision. Patient Instructions Well Visit, Men 48 to 72: Care Instructions Your Care Instructions Physical exams can help you stay healthy.  Your doctor has checked your overall health and may have suggested ways to take good care of yourself. He or she also may have recommended tests. At home, you can help prevent illness with healthy eating, regular exercise, and other steps. Follow-up care is a key part of your treatment and safety. Be sure to make and go to all appointments, and call your doctor if you are having problems. It's also a good idea to know your test results and keep a list of the medicines you take. How can you care for yourself at home? · Reach and stay at a healthy weight. This will lower your risk for many problems, such as obesity, diabetes, heart disease, and high blood pressure. · Get at least 30 minutes of exercise on most days of the week. Walking is a good choice. You also may want to do other activities, such as running, swimming, cycling, or playing tennis or team sports. · Do not smoke. Smoking can make health problems worse. If you need help quitting, talk to your doctor about stop-smoking programs and medicines. These can increase your chances of quitting for good. · Protect your skin from too much sun. When you're outdoors from 10 a.m. to 4 p.m., stay in the shade or cover up with clothing and a hat with a wide brim. Wear sunglasses that block UV rays. Even when it's cloudy, put broad-spectrum sunscreen (SPF 30 or higher) on any exposed skin. · See a dentist one or two times a year for checkups and to have your teeth cleaned. · Wear a seat belt in the car. · Limit alcohol to 2 drinks a day. Too much alcohol can cause health problems. Follow your doctor's advice about when to have certain tests. These tests can spot problems early. · Cholesterol. Your doctor will tell you how often to have this done based on your overall health and other things that can increase your risk for heart attack and stroke. · Blood pressure.  Have your blood pressure checked during a routine doctor visit. Your doctor will tell you how often to check your blood pressure based on your age, your blood pressure results, and other factors. · Prostate exam. Talk to your doctor about whether you should have a blood test (called a PSA test) for prostate cancer. Experts disagree on whether men should have this test. Some experts recommend that you discuss the benefits and risks of the test with your doctor. · Diabetes. Ask your doctor whether you should have tests for diabetes. · Vision. Some experts recommend that you have yearly exams for glaucoma and other age-related eye problems starting at age 48. · Hearing. Tell your doctor if you notice any change in your hearing. You can have tests to find out how well you hear. · Colon cancer. You should begin tests for colon cancer at age 48. You may have one of several tests. Your doctor will tell you how often to have tests based on your age and risk. Risks include whether you already had a precancerous polyp removed from your colon or whether your parent, brother, sister, or child has had colon cancer. · Heart attack and stroke risk. At least every 4 to 6 years, you should have your risk for heart attack and stroke assessed. Your doctor uses factors such as your age, blood pressure, cholesterol, and whether you smoke or have diabetes to show what your risk for a heart attack or stroke is over the next 10 years. · Abdominal aortic aneurysm. Ask your doctor whether you should have a test to check for an aneurysm. You may need a test if you ever smoked or if your parent, brother, sister, or child has had an aneurysm. When should you call for help? Watch closely for changes in your health, and be sure to contact your doctor if you have any problems or symptoms that concern you. Where can you learn more? Go to http://josemanuel-cate.info/. Enter N798 in the search box to learn more about \"Well Visit, Men 48 to 72: Care Instructions. \" 
 Current as of: May 12, 2017 Content Version: 11.4 © 8346-5567 Healthwise, [employer]. Care instructions adapted under license by [employer] (which disclaims liability or warranty for this information). If you have questions about a medical condition or this instruction, always ask your healthcare professional. [address] any warranty or liability for your use of this information. Introducing [facility]! Dear Jimmy Bajwa: 
Thank you for requesting a Treatful account. Our records indicate that you already have an active Treatful account. You can access your account anytime at [email or web address] Did you know that you can access your hospital and ER discharge instructions at any time in Treatful? You can also review all of your test results from your hospital stay or ER visit. Additional Information If you have questions, please visit the Frequently Asked Questions section of the Treatful website at [email or web address] Remember, Treatful is NOT to be used for urgent needs. For medical emergencies, dial 911. Now available from your iPhone and Android! Please provide this summary of care documentation to your next provider. Your primary care clinician is listed as Nahomy Llanes. If you have any questions after today's visit, please call [phone].

## 2018-01-30 NOTE — PROGRESS NOTES
HISTORY OF PRESENT ILLNESS  Thea Tovar is a 58 y.o. male. HPI  Thea Tovar is here for complete health maintenance physical exam and screening. he does have other concerns. 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 Is coping with abrupt change. Some anxiety but doing ok. Prediabetes:  Thea Tovar is here for follow up of elevated fasting sugars and prediabetes. he has been counseled before on low carb/ low concentrated sweets diet and is not following that plan. further diabetic ROS: no polyuria or polydipsia, no chest pain, dyspnea or TIAs, no numbness, tingling or pain in extremities . Lab Results   Component Value Date/Time    Glucose 94 01/26/2017 10:24 AM     Lab Results   Component Value Date/Time    Hemoglobin A1c 5.5 08/14/2015 10:53 AM     Last Point of Care HGB A1C  No results found for: [address] maintenance hx includes:  Exercise: not active.   Form of exercise: intermittant elliptical, free wts , treadmill  Diet: generally follows a low fat low cholesterol diet, exercises sporadically  unemployed  Cancer screening:    Colon cancer screening:  Last Colonoscopy: never   Prostate cancer screening: PSA and/or BETO:   Lab Results   Component Value Date/Time    Prostate Specific Ag 3.0 01/26/2017 10:24 AM    Prostate Specific Ag 2.7 08/07/2015 08:42 AM    Prostate Specific Ag 2.6 07/18/2014 11:29 AM          Lab Results   Component Value Date/Time    Cholesterol, total 141 01/26/2017 10:24 AM    HDL Cholesterol 60 01/26/2017 10:24 AM    LDL, calculated 69 01/26/2017 10:24 AM    VLDL, calculated 12 01/26/2017 10:24 AM    Triglyceride 62 01/26/2017 10:24 AM       Lab Results   Component Value Date/Time    Glucose 94 01/26/2017 10:24 AM         Immunizations:     Immunization History   Administered Date(s) Administered    Influenza Vaccine 11/04/2013, 10/01/2014, 09/23/2015, 10/01/2016, 10/01/2017    Influenza Vaccine (Whole Virus) 10/01/2012    Tdap 07/03/2013    Zoster Vaccine, Live 06/01/2016      Immunization status: up to date and documented. Social History     Social History    Marital status:      Spouse name: N/A    Number of children: N/A    Years of education: N/A     Occupational History    Not on file. Social History Main Topics    Smoking status: Current Every Day Smoker     Packs/day: 0.25     Years: 10.00     Types: Cigarettes     Last attempt to quit: 8/9/2014    Smokeless tobacco: Never Used      Comment: a pack a week    Alcohol use 4.5 oz/week     3 Glasses of wine, 3 Cans of beer, 3 Shots of liquor per week    Drug use: Yes     Special: Marijuana    Sexual activity: Not Currently     Partners: Female     Other Topics Concern    Not on file     Social History Narrative     Past Surgical History:   Procedure Laterality Date    HX ORTHOPAEDIC      R ring finger fracture, [facility]     Family History   Problem Relation Age of Onset    Elevated Lipids Mother     Hypertension Mother     Heart Disease Father     Dementia Father     Cancer Brother      Hodgkins Lymphoma    Diabetes Maternal Grandmother      Current Outpatient Prescriptions on File Prior to Visit   Medication Sig Dispense Refill    montelukast (SINGULAIR) 10 mg tablet TAKE 1 TABLET BY MOUTH  DAILY 90 Tab 3    atorvastatin (LIPITOR) 20 mg tablet TAKE 1 TABLET BY MOUTH  NIGHTLY 90 Tab 2    magnesium 250 mg tab Take  by mouth.  cyanocobalamin 1,000 mcg tablet Take 1,000 mcg by mouth daily.  sildenafil (REVATIO) 20 mg tablet Take 3 Tabs by mouth daily as needed. 90 Tab 2    ascorbic acid (VITAMIN C) 250 mg tablet Take  by mouth.  ibuprofen (MOTRIN) 200 mg tablet Take 600 mg by mouth daily as needed for Pain.  esomeprazole (NEXIUM 24HR) 20 mg capsule Take  by mouth daily.  CALCIUM-VITAMIN D3 PO Take  by mouth. 2 tablets daily      MULTIVITAMIN (MULTIPLE VITAMINS PO) Take  by mouth.       omega-3 fatty acids-vitamin e (FISH OIL) 1,000 mg cap Take 1 Cap by mouth. No current facility-administered medications on file prior to visit. .    Review of Systems   Constitutional: Negative for malaise/fatigue and weight loss. HENT: Negative for sore throat. Eyes: Negative for blurred vision and pain. Respiratory: Negative for cough, shortness of breath and wheezing. Cardiovascular: Negative for chest pain, palpitations and leg swelling. Gastrointestinal: Negative for constipation, diarrhea, heartburn, nausea and vomiting. Genitourinary: Negative for dysuria and hematuria. Musculoskeletal: Negative for back pain, joint pain and myalgias. Skin: Negative for rash. Neurological: Negative for dizziness and headaches. Psychiatric/Behavioral: Negative for depression. The patient is not nervous/anxious. Physical Exam   Constitutional: He is oriented to person, place, and time. He appears well-developed and well-nourished. No distress. Body mass index is 26.72 kg/(m^2). /80  Pulse (!) 52  Temp 97.7 °F (36.5 °C) (Oral)   Resp 18  Ht 5' 10\" (1.778 m)  Wt 186 lb 4 oz (84.5 kg)  SpO2 100%  BMI 26.72 kg/m2   HENT:   Head: Normocephalic and atraumatic. Right Ear: Hearing, tympanic membrane and ear canal normal.   Left Ear: Hearing, tympanic membrane and ear canal normal.   Nose: Nose normal.   Mouth/Throat: Oropharynx is clear and moist and mucous membranes are normal. Normal dentition. Eyes: Conjunctivae and lids are normal. Pupils are equal, round, and reactive to light. Right eye exhibits no discharge. Left eye exhibits no discharge. No scleral icterus. Neck: Trachea normal. No thyromegaly present. Cardiovascular: Normal rate, regular rhythm, normal heart sounds, intact distal pulses and normal pulses. Exam reveals no gallop and no friction rub. No murmur heard. Pulmonary/Chest: Effort normal and breath sounds normal. No respiratory distress. Abdominal: Soft.  Normal appearance and bowel sounds are normal. He exhibits no distension and no mass. There is no hepatosplenomegaly. There is no tenderness. There is no CVA tenderness. Musculoskeletal: Normal range of motion. He exhibits no edema or tenderness. Lymphadenopathy:     He has no cervical adenopathy. Neurological: He is alert and oriented to person, place, and time. Skin: Skin is warm and dry. No rash noted. He is not diaphoretic. Psychiatric: He has a normal mood and affect. His speech is normal and behavior is normal. Judgment and thought content normal. Cognition and memory are normal.   Nursing note and vitals reviewed. ASSESSMENT and PLAN  Diagnoses and all orders for this visit:    1. Preventative health care  -     REFERRAL TO GASTROENTEROLOGY  -     LIPID PANEL  -     METABOLIC PANEL, BASIC  -     PROSTATE SPECIFIC AG  -     HEMOGLOBIN A1C WITH EAG  he was advised to have follow up colonoscopy in 2018  Lynell Bosworth was counseled on age-appropriate/ guideline-based risk prevention behaviors and screening for a 58y.o. year old   male . We also discussed adjustments in screening based on family history if necessary. Printed instructions for preventative screening guidelines and healthy behaviors given to patient with after visit summary. 2. Prediabetes - recheck. Low carb diet.  -     HEMOGLOBIN A1C WITH EAG    3. Pure hypercholesterolemia - recheck now. Will likely need statin. 4. Colon cancer screening  -     REFERRAL TO GASTROENTEROLOGY      Follow-up Disposition:  Return in about 1 year (around 1/30/2019).

## 2018-01-31 LAB
BUN SERPL-MCNC: 17 MG/DL (ref 8–27)
BUN/CREAT SERPL: 18 (ref 10–24)
CALCIUM SERPL-MCNC: 9.2 MG/DL (ref 8.6–10.2)
CHLORIDE SERPL-SCNC: 99 MMOL/L (ref 96–106)
CHOLEST SERPL-MCNC: 184 MG/DL (ref 100–199)
CO2 SERPL-SCNC: 27 MMOL/L (ref 18–29)
CREAT SERPL-MCNC: 0.97 MG/DL (ref 0.76–1.27)
EST. AVERAGE GLUCOSE BLD GHB EST-MCNC: 108 MG/DL
GFR SERPLBLD CREATININE-BSD FMLA CKD-EPI: 83 ML/MIN/1.73
GFR SERPLBLD CREATININE-BSD FMLA CKD-EPI: 96 ML/MIN/1.73
GLUCOSE SERPL-MCNC: 102 MG/DL (ref 65–99)
HBA1C MFR BLD: 5.4 % (ref 4.8–5.6)
HDLC SERPL-MCNC: 57 MG/DL
INTERPRETATION, 910389: NORMAL
LDLC SERPL CALC-MCNC: 97 MG/DL (ref 0–99)
POTASSIUM SERPL-SCNC: 4.3 MMOL/L (ref 3.5–5.2)
PSA SERPL-MCNC: 3.2 NG/ML (ref 0–4)
SODIUM SERPL-SCNC: 139 MMOL/L (ref 134–144)
TRIGL SERPL-MCNC: 152 MG/DL (ref 0–149)
VLDLC SERPL CALC-MCNC: 30 MG/DL (ref 5–40)

## 2018-02-07 ENCOUNTER — HOSPITAL ENCOUNTER (EMERGENCY)
Age: 63
Discharge: HOME | End: 2018-02-08
Attending: EMERGENCY MEDICINE
Payer: COMMERCIAL

## 2018-02-07 DIAGNOSIS — R42 DIZZINESS: Primary | ICD-10-CM

## 2018-02-07 LAB
ALBUMIN SERPL-MCNC: 4.2 G/DL (ref 3.5–5)
ALBUMIN/GLOB SERPL: 1.1 {RATIO} (ref 1.1–2.2)
ALP SERPL-CCNC: 64 U/L (ref 45–117)
ALT SERPL-CCNC: 47 U/L (ref 12–78)
ANION GAP SERPL CALC-SCNC: 12 MMOL/L (ref 5–15)
AST SERPL-CCNC: 24 U/L (ref 15–37)
BASOPHILS # BLD: 0.1 K/UL (ref 0–0.1)
BASOPHILS NFR BLD: 1 % (ref 0–1)
BILIRUB SERPL-MCNC: 0.5 MG/DL (ref 0.2–1)
BUN SERPL-MCNC: 22 MG/DL (ref 6–20)
BUN/CREAT SERPL: 19 (ref 12–20)
CALCIUM SERPL-MCNC: 9.2 MG/DL (ref 8.5–10.1)
CHLORIDE SERPL-SCNC: 103 MMOL/L (ref 97–108)
CK MB CFR SERPL CALC: 0.9 % (ref 0–2.5)
CK MB SERPL-MCNC: 2.5 NG/ML (ref 5–25)
CK SERPL-CCNC: 270 U/L (ref 39–308)
CO2 SERPL-SCNC: 23 MMOL/L (ref 21–32)
CREAT SERPL-MCNC: 1.18 MG/DL (ref 0.7–1.3)
DIFFERENTIAL METHOD BLD: NORMAL
EOSINOPHIL # BLD: 0 K/UL (ref 0–0.4)
EOSINOPHIL NFR BLD: 1 % (ref 0–7)
ERYTHROCYTE [DISTWIDTH] IN BLOOD BY AUTOMATED COUNT: 13.2 % (ref 11.5–14.5)
GLOBULIN SER CALC-MCNC: 4 G/DL (ref 2–4)
GLUCOSE SERPL-MCNC: 138 MG/DL (ref 65–100)
HCT VFR BLD AUTO: 44.1 % (ref 36.6–50.3)
HGB BLD-MCNC: 15 G/DL (ref 12.1–17)
IMM GRANULOCYTES # BLD: 0 K/UL (ref 0–0.04)
IMM GRANULOCYTES NFR BLD AUTO: 0 % (ref 0–0.5)
LYMPHOCYTES # BLD: 1.7 K/UL (ref 0.8–3.5)
LYMPHOCYTES NFR BLD: 25 % (ref 12–49)
MCH RBC QN AUTO: 30.2 PG (ref 26–34)
MCHC RBC AUTO-ENTMCNC: 34 G/DL (ref 30–36.5)
MCV RBC AUTO: 88.7 FL (ref 80–99)
MONOCYTES # BLD: 0.5 K/UL (ref 0–1)
MONOCYTES NFR BLD: 7 % (ref 5–13)
NEUTS SEG # BLD: 4.4 K/UL (ref 1.8–8)
NEUTS SEG NFR BLD: 66 % (ref 32–75)
NRBC # BLD: 0 K/UL (ref 0–0.01)
NRBC BLD-RTO: 0 PER 100 WBC
PLATELET # BLD AUTO: 211 K/UL (ref 150–400)
PMV BLD AUTO: 9.8 FL (ref 8.9–12.9)
POTASSIUM SERPL-SCNC: 4.1 MMOL/L (ref 3.5–5.1)
PROT SERPL-MCNC: 8.2 G/DL (ref 6.4–8.2)
RBC # BLD AUTO: 4.97 M/UL (ref 4.1–5.7)
SODIUM SERPL-SCNC: 138 MMOL/L (ref 136–145)
TROPONIN I SERPL-MCNC: <0.04 NG/ML
WBC # BLD AUTO: 6.6 K/UL (ref 4.1–11.1)

## 2018-02-07 PROCEDURE — 99283 EMERGENCY DEPT VISIT LOW MDM: CPT

## 2018-02-07 PROCEDURE — 85025 COMPLETE CBC W/AUTO DIFF WBC: CPT | Performed by: EMERGENCY MEDICINE

## 2018-02-07 PROCEDURE — 93005 ELECTROCARDIOGRAM TRACING: CPT

## 2018-02-07 PROCEDURE — 84484 ASSAY OF TROPONIN QUANT: CPT | Performed by: EMERGENCY MEDICINE

## 2018-02-07 PROCEDURE — 82550 ASSAY OF CK (CPK): CPT | Performed by: EMERGENCY MEDICINE

## 2018-02-07 PROCEDURE — 80053 COMPREHEN METABOLIC PANEL: CPT | Performed by: EMERGENCY MEDICINE

## 2018-02-07 PROCEDURE — 36415 COLL VENOUS BLD VENIPUNCTURE: CPT | Performed by: EMERGENCY MEDICINE

## 2018-02-08 VITALS
BODY MASS INDEX: 25.09 KG/M2 | SYSTOLIC BLOOD PRESSURE: 107 MMHG | WEIGHT: 179.2 LBS | TEMPERATURE: 98.1 F | HEART RATE: 66 BPM | DIASTOLIC BLOOD PRESSURE: 69 MMHG | HEIGHT: 71 IN | OXYGEN SATURATION: 99 % | RESPIRATION RATE: 16 BRPM

## 2018-02-08 LAB
ATRIAL RATE: 60 BPM
CALCULATED P AXIS, ECG09: 56 DEGREES
CALCULATED R AXIS, ECG10: 0 DEGREES
CALCULATED T AXIS, ECG11: 66 DEGREES
DIAGNOSIS, 93000: NORMAL
P-R INTERVAL, ECG05: 162 MS
Q-T INTERVAL, ECG07: 418 MS
QRS DURATION, ECG06: 98 MS
QTC CALCULATION (BEZET), ECG08: 418 MS
VENTRICULAR RATE, ECG03: 60 BPM

## 2018-02-08 NOTE — ED TRIAGE NOTES
Pt complains of chest tightness, anxiety feeling and dizziness since Monday morning that has been constant. Has seen PCP last week, states his BP and blood sugar were a little high at that time. Also complains of feeling bloated.   Pt states being under a lot of stress as he recently lost his job, denies SI.

## 2018-02-08 NOTE — ED PROVIDER NOTES
HPI Comments: 58 y.o. male with past medical history significant for GERD, chronic pain, and kidney stone who presents from home with chief complaint of dizziness. Pt states he had sudden onset three days ago of dizziness which has gradually worsened. Pt reports having exacerbated dizziness with changing positions, but he denies worsened symptoms with head movement. 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 Pt states he has had some recent abdominal bloating with eating. Pt also notes he lost his job about one year ago and he has had a lot of recent stress and anxiety. Pt states he has been trying to meditate which has provided some relief. Pt denies having SI or HI. Pt denies having nausea, vomiting, HA, peripheral numbness, or difficulty walking. There are no other acute medical concerns at this time. Social hx: Yes tobacco (occasional), Yes alcohol (occasional)    PCP: Coreen Flowers MD    Note written by Mariano Brody. Maria E Renae, as dictated by Llii Brizuela MD 10:08 PM    The history is provided by the patient. Past Medical History:   Diagnosis Date    Calculus of kidney     Chronic pain     GERD (gastroesophageal reflux disease)        Past Surgical History:   Procedure Laterality Date    HX ORTHOPAEDIC      R ring finger fracture, ORIF         Family History:   Problem Relation Age of Onset    Elevated Lipids Mother     Hypertension Mother     Heart Disease Father     Dementia Father     Cancer Brother      Hodgkins Lymphoma    Diabetes Maternal Grandmother        Social History     Social History    Marital status:      Spouse name: N/A    Number of children: N/A    Years of education: N/A     Occupational History    Not on file.      Social History Main Topics    Smoking status: Current Every Day Smoker     Packs/day: 0.25     Years: 10.00     Types: Cigarettes     Last attempt to quit: 8/9/2014   [facility] Smokeless tobacco: Never Used      Comment: a pack a week    Alcohol use 4.5 oz/week     3 Glasses of wine, 3 Cans of beer, 3 Shots of liquor per week    Drug use: Yes     Special: Marijuana    Sexual activity: Not Currently     Partners: Female     Other Topics Concern    Not on file     Social History Narrative         ALLERGIES: Review of patient's allergies indicates no known allergies. Review of Systems   Constitutional: Negative for activity change, appetite change and fatigue. HENT: Positive for tinnitus. Negative for ear pain, facial swelling, sore throat and trouble swallowing. Eyes: Negative for pain, discharge and visual disturbance. Respiratory: Negative for chest tightness, shortness of breath and wheezing. Cardiovascular: Negative for chest pain and palpitations. Gastrointestinal: Positive for abdominal pain (bloating). Negative for blood in stool, nausea and vomiting. Genitourinary: Negative for difficulty urinating, flank pain and hematuria. Musculoskeletal: Negative for arthralgias, joint swelling, myalgias and neck pain. Skin: Negative for color change and rash. Neurological: Positive for dizziness. Negative for weakness, numbness and headaches. Hematological: Negative for adenopathy. Does not bruise/bleed easily. Psychiatric/Behavioral: Negative for behavioral problems, confusion, self-injury, sleep disturbance and suicidal ideas. The patient is nervous/anxious. All other systems reviewed and are negative. Vitals:    02/07/18 1928   BP: 151/85   Pulse: 67   Resp: 18   Temp: 98.1 °F (36.7 °C)   SpO2: 97%   Weight: 81.3 kg (179 lb 3.2 oz)   Height: 5' 10.5\" (1.791 m)            Physical Exam   Constitutional: He is oriented to person, place, and time. He appears well-developed and well-nourished. No distress. HENT:   Head: Normocephalic and atraumatic.    Nose: Nose normal.   Mouth/Throat: Oropharynx is clear and moist.   Eyes: Conjunctivae and EOM are normal. Pupils are equal, round, and reactive to light. No scleral icterus. Neck: Normal range of motion. Neck supple. No JVD present. No tracheal deviation present. No thyromegaly present. No carotid bruits noted. Cardiovascular: Normal rate, regular rhythm, normal heart sounds and intact distal pulses. Exam reveals no gallop and no friction rub. No murmur heard. Pulmonary/Chest: Effort normal and breath sounds normal. No respiratory distress. He has no wheezes. He has no rales. He exhibits no tenderness. Abdominal: Soft. Bowel sounds are normal. He exhibits no distension and no mass. There is no tenderness. There is no rebound and no guarding. Musculoskeletal: Normal range of motion. He exhibits no edema or tenderness. Lymphadenopathy:     He has no cervical adenopathy. Neurological: He is alert and oriented to person, place, and time. He has normal reflexes. No cranial nerve deficit. Coordination normal.   Skin: Skin is warm and dry. No rash noted. No erythema. Psychiatric: He has a normal mood and affect. His behavior is normal. Judgment and thought content normal.   Nursing note and vitals reviewed. Note written by Isis Tom.  Tahmina Nelson, as dictated by Miranda Calix MD 10:25 PM       MDM  Number of Diagnoses or Management Options  Dizziness: new and requires workup     Amount and/or Complexity of Data Reviewed  Clinical lab tests: ordered and reviewed  Tests in the radiology section of CPT®: ordered and reviewed  Decide to obtain previous medical records or to obtain history from someone other than the patient: yes  Review and summarize past medical records: yes  Discuss the patient with other providers: yes  Independent visualization of images, tracings, or specimens: yes    Risk of Complications, Morbidity, and/or Mortality  Presenting problems: high  Diagnostic procedures: high  Management options: high    Patient Progress  Patient progress: stable        ED Course Procedures      PROGRESS NOTE:  10:26 PM  Plan is to get orthostatics and blood work. Will get a troponin and CK. Work up is negative. Will discharge home to follow up with own MD if continued difficulty.

## 2018-02-09 ENCOUNTER — OFFICE VISIT (OUTPATIENT)
Dept: INTERNAL MEDICINE CLINIC | Age: 63
End: 2018-02-09

## 2018-02-09 VITALS
OXYGEN SATURATION: 100 % | HEART RATE: 60 BPM | HEIGHT: 71 IN | BODY MASS INDEX: 25.22 KG/M2 | TEMPERATURE: 98 F | DIASTOLIC BLOOD PRESSURE: 80 MMHG | WEIGHT: 180.13 LBS | RESPIRATION RATE: 18 BRPM | SYSTOLIC BLOOD PRESSURE: 140 MMHG

## 2018-02-09 DIAGNOSIS — R42 EPISODIC LIGHTHEADEDNESS: Primary | ICD-10-CM

## 2018-02-09 DIAGNOSIS — F41.9 ANXIETY: ICD-10-CM

## 2018-02-09 NOTE — MR AVS SNAPSHOT
[facility] 
 
 
 [address]  [place] [address] [facility] 
[phone] Patient: Sofia Winter MRN: [id number] HNL:[birth year]17[birth year] Visit Information Date & Time Provider Department Dept. Phone Encounter #  
 2/9/2018  8:45 AM Travon López MD [facility] [id number] Follow-up Instructions Return in about 1 week (around 2/16/2018). Upcoming Health Maintenance Date Due COLONOSCOPY 4/23/1973 Pneumococcal 19-64 Medium Risk (1 of 1 - PPSV23) 4/23/1974 DTaP/Tdap/Td series (2 - Td) 7/3/2023 Allergies as of 2/9/2018  Review Complete On: 2/7/2018 By: Briana Mcintosh RN No Known Allergies Current Immunizations  Reviewed on 1/30/2018 Name Date Influenza Vaccine 10/1/2017, 10/1/2016, 9/23/2015, 10/1/2014, 11/4/2013 Influenza Vaccine (Whole Virus) 10/1/2012 Tdap 7/3/2013 Zoster Vaccine, Live 6/1/2016 Not reviewed this visit You Were Diagnosed With   
  
 Codes Comments Episodic lightheadedness    -  Primary ICD-10-CM: Z75 ICD-9-CM: 780.4 Anxiety     ICD-10-CM: F41.9 ICD-9-CM: 300.00 Vitals BP Pulse Temp Resp Height(growth percentile) Weight(growth percentile) 148/88 (BP 1 Location: Left arm, BP Patient Position: Sitting) (!) 49 98 °F (36.7 °C) (Oral) 18 5' 10.5\" (1.791 m) 180 lb 2 oz (81.7 kg) SpO2 BMI Smoking Status 100% 25.48 kg/m2 Current Every Day Smoker Vitals History BMI and BSA Data Body Mass Index Body Surface Area  
 25.48 kg/m 2 2.02 m 2 Preferred Pharmacy Pharmacy Name Phone [id number] [phone] Your Updated Medication List  
  
   
This list is accurate as of: 2/9/18  9:17 AM.  Always use your most recent med list.  
  
  
  
  
 atorvastatin 20 mg tablet Commonly known as:  LIPITOR  
TAKE 1 TABLET BY MOUTH  NIGHTLY CALCIUM-VITAMIN D3 PO Take  by mouth. 2 tablets daily  
  
 cyanocobalamin 1,000 mcg tablet Take 1,000 mcg by mouth daily. FISH OIL 1,000 mg Cap Generic drug:  omega-3 fatty acids-vitamin e Take 1 Cap by mouth. ibuprofen 200 mg tablet Commonly known as:  MOTRIN Take 600 mg by mouth daily as needed for Pain.  
  
 magnesium 250 mg Tab Take  by mouth.  
  
 montelukast 10 mg tablet Commonly known as:  SINGULAIR  
TAKE 1 TABLET BY MOUTH  DAILY MULTIPLE VITAMINS PO Take  by mouth. NexIUM 24HR 20 mg capsule Generic drug:  esomeprazole Take  by mouth daily. sildenafil (antihypertensive) 20 mg tablet Commonly known as:  REVATIO Take 3 Tabs by mouth daily as needed. VITAMIN C 250 mg tablet Generic drug:  ascorbic acid (vitamin C) Take  by mouth. Follow-up Instructions Return in about 1 week (around 2/16/2018). To-Do List   
 02/23/2018 ECHO:  ECHO EXERCISE STRESS Introducing [facility]! Dear Raciel Yun: 
Thank you for requesting a [facility] account. Our records indicate that you already have an active [facility] account. You can access your account anytime at [email or web address] Did you know that you can access your hospital and ER discharge instructions at any time in [facility]? You can also review all of your test results from your hospital stay or ER visit. Additional Information If you have questions, please visit the Frequently Asked Questions section of the [facility] website at [email or web address] Remember, [facility] is NOT to be used for urgent needs. For medical emergencies, dial 911. Now available from your iPhone and Android! Please provide this summary of care documentation to your next provider. Your primary care clinician is listed as Quang Hutchins. If you have any questions after today's visit, please call [phone].

## 2018-02-09 NOTE — PROGRESS NOTES
HISTORY OF PRESENT ILLNESS  Edilberto King is a 58 y.o. male. HPI  Problem follow up: Edilberto King returns for follow up visit regarding dizziness following heavy etoh intake night before. .  he was seen 2 days ago in [facility] diagnosed with dizziness and treated with nothing yet. Workup was significant for   Lab Results   Component Value Date/Time    Sodium 138 02/07/2018 08:24 PM    Potassium 4.1 02/07/2018 08:24 PM    Chloride 103 02/07/2018 08:24 PM    CO2 23 02/07/2018 08:24 PM    Anion gap 12 02/07/2018 08:24 PM    Glucose 138 (H) 02/07/2018 08:24 PM    BUN 22 (H) 02/07/2018 08:24 PM    Creatinine 1.18 02/07/2018 08:24 PM    BUN/Creatinine ratio 19 02/07/2018 08:24 PM    GFR est AA >60 02/07/2018 08:24 PM    GFR est non-AA >60 02/07/2018 08:24 PM    Calcium 9.2 02/07/2018 08:24 PM    Bilirubin, total 0.5 02/07/2018 08:24 PM    AST (SGOT) 24 02/07/2018 08:24 PM    Alk. phosphatase 64 02/07/2018 08:24 PM    Protein, total 8.2 02/07/2018 08:24 PM    Albumin 4.2 02/07/2018 08:24 PM    Globulin 4.0 02/07/2018 08:24 PM    A-G Ratio 1.1 02/07/2018 08:24 PM    ALT (SGPT) 47 02/07/2018 08:24 PM     Lab Results   Component Value Date/Time    WBC 6.6 02/07/2018 08:24 PM    HGB 15.0 02/07/2018 08:24 PM    HCT 44.1 02/07/2018 08:24 PM    PLATELET 413 69/06/5539 08:24 PM    MCV 88.7 02/07/2018 08:24 PM     Lab Results   Component Value Date/Time     02/07/2018 08:24 PM    CK - MB 2.5 02/07/2018 08:24 PM    CK-MB Index 0.9 02/07/2018 08:24 PM    Troponin-I, Qt. <0.04 02/07/2018 08:24 PM   EKG: .  Diagnosis   Final      Normal sinus rhythm   Nonspecific T wave abnormality   Cannot rule out Septal infarct , age undetermined   No previous ECGs available          . Notes, labs, studies, imaging related to this problem during prior visit were available . Since that visit, he has improved. he has been compliant with prescribed treatment. Residual symptoms include: ? Worse with turning head. ? Vertigo.   No sense of near fainting. No balance problems. Some increase anxiety. No palpitation. No nausea. No new tinnitis. Chronic hearing loss. New issues associated with this problem include: none. ROS    Physical Exam   Constitutional: He appears well-developed and well-nourished. No distress. /88 (BP 1 Location: Left arm, BP Patient Position: Sitting)  Pulse (!) 49  Temp 98 °F (36.7 °C) (Oral)   Resp 18  Ht 5' 10.5\" (1.791 m)  Wt 180 lb 2 oz (81.7 kg)  SpO2 100%  BMI 25.48 kg/m2   HENT:   Head: Normocephalic and atraumatic. Right Ear: Tympanic membrane and ear canal normal. No decreased hearing is noted. Left Ear: Tympanic membrane and ear canal normal. No decreased hearing is noted. Nose: Mucosal edema and rhinorrhea present. No epistaxis. Right sinus exhibits no maxillary sinus tenderness and no frontal sinus tenderness. Left sinus exhibits no maxillary sinus tenderness and no frontal sinus tenderness. Mouth/Throat: Uvula is midline and mucous membranes are normal. No oral lesions. Normal dentition. Posterior oropharyngeal erythema present. No oropharyngeal exudate, posterior oropharyngeal edema or tonsillar abscesses. Eyes: Conjunctivae are normal. Pupils are equal, round, and reactive to light. Right eye exhibits no discharge. Left eye exhibits no discharge. No scleral icterus. Right eye exhibits normal extraocular motion and no nystagmus. Left eye exhibits normal extraocular motion and no nystagmus. Neck: Neck supple. Cardiovascular: Normal rate, regular rhythm and normal heart sounds. Pulmonary/Chest: Effort normal and breath sounds normal.   Lymphadenopathy:     He has no cervical adenopathy. Neurological: He is alert. Hallpike-Karma maneuver performed and was Negative on right with duration 0 seconds and Negative on left duration 0 seconds. Skin: Skin is warm and dry. He is not diaphoretic. Nursing note and vitals reviewed.       ASSESSMENT and PLAN  Diagnoses and all orders for this visit:    1. Episodic lightheadedness - improving gradually - does not appear to be orthostasis, BPPV. No associated symptoms. He does have risks for   CVD. ? Mild dehydration originally. ? Anxiety exacerbated? Check echo stress. If negative, will treat anxiety. Good po hydration meantime.  -     ECHO EXERCISE STRESS; Future    2. Anxiety      Follow-up Disposition:  Return in about 1 week (around 2/16/2018).

## 2018-02-10 ENCOUNTER — TELEPHONE (OUTPATIENT)
Dept: INTERNAL MEDICINE CLINIC | Age: 63
End: 2018-02-10

## 2018-02-10 DIAGNOSIS — F41.9 ANXIETY: Primary | ICD-10-CM

## 2018-02-10 RX ORDER — CLONAZEPAM 0.5 MG/1
0.5 TABLET ORAL
Qty: 30 TAB | Refills: 0 | OUTPATIENT
Start: 2018-02-10 | End: 2022-07-26 | Stop reason: ALTCHOICE

## 2018-02-10 NOTE — TELEPHONE ENCOUNTER
Will order clonazepam.  New medication or Refill ordered. I phoned prescription(s) to patients pharmacy as requested.

## 2018-02-10 NOTE — TELEPHONE ENCOUNTER
----- Message from Sherrie Alcantara LPN sent at 2/02/9711 10:28 AM EST -----  Regarding: FW: Visit Follow-Up Question  Contact: [phone]  Request for anxiety medication.  ----- Message -----     From: Haile Sauer     Sent: 2/9/2018   2:58 PM       To: [facility]  Subject: Visit Follow-Up Question                         Hi Doctor Enrico Arredondo to keep contacting you today. My level of anxiety has remained pretty high today. Is there any way to get a low dose anti anxiety prescription sent to [facility] today? I know we were trying to hold off until the stress test but I think the medication might make me feel better in the interim.

## 2018-02-13 ENCOUNTER — TELEPHONE (OUTPATIENT)
Dept: INTERNAL MEDICINE CLINIC | Age: 63
End: 2018-02-13

## 2018-02-13 DIAGNOSIS — R42 DIZZINESS: Primary | ICD-10-CM

## 2018-02-14 NOTE — TELEPHONE ENCOUNTER
Cancelled echo exercise stress appointment for today due to denied pre-cert from insurance company. They are faxing over information about denial to the office.

## 2018-02-14 NOTE — TELEPHONE ENCOUNTER
Patient showed up for appt and states he was never notified it was canceled.   Pt would like a callback about this and find out when he is supposed to go in for this test.

## 2018-02-15 NOTE — TELEPHONE ENCOUNTER
Patient scheduled at [facility] [address] on 2/19 with him arriving at 1:30 pm.

## 2018-02-23 ENCOUNTER — CLINICAL SUPPORT (OUTPATIENT)
Dept: CARDIOLOGY CLINIC | Age: 63
End: 2018-02-23

## 2018-02-23 DIAGNOSIS — R42 DIZZINESS: Primary | ICD-10-CM

## 2018-02-23 DIAGNOSIS — R73.03 PREDIABETES: ICD-10-CM

## 2018-02-23 DIAGNOSIS — E78.00 PURE HYPERCHOLESTEROLEMIA: ICD-10-CM

## 2018-02-23 NOTE — PROGRESS NOTES
See scanned report. Dr. Alia Ellis ordered study and Dr. Ansley Dillon read study. ID verified per protocol. Explained procedure to patient. Obtaining IV access, radiation exposure, risks and discomforts (for exercise- change to lexiwalk stress). , waiting between injection(s) and obtaining images. All concerns and questions addressed prior to obtaining consent test. Patient developed mild dyspnea during test. At 1 minute in recovery, patient without symptoms or complaints voiced.

## 2018-03-14 ENCOUNTER — TELEPHONE (OUTPATIENT)
Dept: INTERNAL MEDICINE CLINIC | Age: 63
End: 2018-03-14

## 2018-03-14 RX ORDER — ESCITALOPRAM OXALATE 5 MG/1
5 TABLET ORAL DAILY
Qty: 30 TAB | Refills: 1 | Status: SHIPPED | OUTPATIENT
Start: 2018-03-14 | End: 2018-03-26 | Stop reason: DRUGHIGH

## 2018-03-14 NOTE — TELEPHONE ENCOUNTER
----- Message from Roberto Bonilla LPN sent at 2/95/5041  4:25 PM EDT -----  Regarding: FW: Visit Follow-Up Question  Contact: [phone]  Patient update and new medication request.  ----- Message -----     From: Brennen Bhanu     Sent: 3/13/2018  11:57 AM       To: [facility] Nurses Pool  Subject: Visit Follow-Up Question                         Hi Doctor Jeane Miller,    The dizziness situation has gotten better, went to the ENT and am now having a few weekly sessions with a Vestibular Physical Therapist thinking that the issue is inner ear imbalance. I am still feeling moderate anxiousness. I believe we discussed the prospect of prescribing a low dose anti-anxiety drug. I would like to go ahead and do that if OK. I think you have prescribed Lexapro in the past but am open to what you think would be most suitable.     Thanks    [employer]

## 2018-03-26 ENCOUNTER — TELEPHONE (OUTPATIENT)
Dept: INTERNAL MEDICINE CLINIC | Age: 63
End: 2018-03-26

## 2018-03-26 DIAGNOSIS — F41.9 ANXIETY AND DEPRESSION: Primary | ICD-10-CM

## 2018-03-26 DIAGNOSIS — F32.A ANXIETY AND DEPRESSION: Primary | ICD-10-CM

## 2018-03-26 RX ORDER — ESCITALOPRAM OXALATE 10 MG/1
10 TABLET ORAL DAILY
Qty: 30 TAB | Refills: 1 | Status: SHIPPED | OUTPATIENT
Start: 2018-03-26 | End: 2018-05-21 | Stop reason: SDUPTHER

## 2018-03-26 NOTE — TELEPHONE ENCOUNTER
----- Message from Michael Rizzo LPN sent at 0/63/8436 11:00 AM EDT -----  Regarding: FW: Prescription Question  Contact: [phone]  Medication follow up. Do you want him to schedule a FU appointment?  ----- Message -----     From: Crystal Soliman     Sent: 3/26/2018  10:39 AM       To: [facility] [place]  Subject: Prescription Question                            Hi Dr. Bibi Dubois,  I have been on the Lexapro 5 mg dose for 12 days and still have some anxiety. Can I move up to 10 mg? I can double up on the 5 mg for a week but will need a new prescription for the 10 mg.    Thanks

## 2018-03-26 NOTE — TELEPHONE ENCOUNTER
Increased lexapro to 10mg/ d. New medication or Refill was escribed to patient's pharmacy as requested.

## 2018-07-16 ENCOUNTER — PATIENT MESSAGE (OUTPATIENT)
Dept: INTERNAL MEDICINE CLINIC | Age: 63
End: 2018-07-16

## 2018-07-16 DIAGNOSIS — Z12.11 SPECIAL SCREENING FOR MALIGNANT NEOPLASMS, COLON: Primary | ICD-10-CM

## 2018-09-27 LAB — COLONOSCOPY, EXTERNAL: NORMAL

## 2018-12-23 RX ORDER — ATORVASTATIN CALCIUM 20 MG/1
TABLET, FILM COATED ORAL
Qty: 90 TAB | Refills: 1 | Status: SHIPPED | OUTPATIENT
Start: 2018-12-23 | End: 2019-05-28 | Stop reason: SDUPTHER

## 2018-12-24 RX ORDER — MONTELUKAST SODIUM 10 MG/1
TABLET ORAL
Qty: 90 TAB | Refills: 3 | Status: SHIPPED | OUTPATIENT
Start: 2018-12-24 | End: 2019-09-16 | Stop reason: SDUPTHER

## 2019-05-30 ENCOUNTER — PATIENT MESSAGE (OUTPATIENT)
Dept: INTERNAL MEDICINE CLINIC | Age: 64
End: 2019-05-30

## 2019-05-31 ENCOUNTER — TELEPHONE (OUTPATIENT)
Dept: INTERNAL MEDICINE CLINIC | Age: 64
End: 2019-05-31

## 2019-05-31 NOTE — TELEPHONE ENCOUNTER
Patient called to follow up on a medication records request. Patient was given [facility] records number to follow up with today. Patient request a call back from Matthew Love in office on Monday to help with this matter as well. It has been over 14 days since patient requested records to be mailed to him. Please call patient to advise.  [phone]

## 2019-09-16 PROBLEM — F41.9 ANXIETY: Status: ACTIVE | Noted: 2019-09-16

## 2019-09-16 PROBLEM — E04.1 THYROID NODULE: Status: ACTIVE | Noted: 2019-09-16

## 2021-03-01 PROBLEM — Z79.899 ENCOUNTER FOR LONG-TERM (CURRENT) USE OF OTHER MEDICATIONS: Status: ACTIVE | Noted: 2021-03-01

## 2022-03-18 PROBLEM — Z79.899 ENCOUNTER FOR LONG-TERM (CURRENT) USE OF OTHER MEDICATIONS: Status: ACTIVE | Noted: 2021-03-01

## 2022-03-19 PROBLEM — E04.1 THYROID NODULE: Status: ACTIVE | Noted: 2019-09-16

## 2022-03-20 PROBLEM — F41.9 ANXIETY: Status: ACTIVE | Noted: 2019-09-16

## 2022-04-06 ENCOUNTER — HOSPITAL ENCOUNTER (OUTPATIENT)
Dept: NUCLEAR MEDICINE | Age: 67
Discharge: HOME | End: 2022-04-06
Attending: INTERNAL MEDICINE
Payer: MEDICARE

## 2022-04-06 ENCOUNTER — HOSPITAL ENCOUNTER (OUTPATIENT)
Dept: NON INVASIVE DIAGNOSTICS | Age: 67
Discharge: HOME | End: 2022-04-06
Attending: INTERNAL MEDICINE
Payer: MEDICARE

## 2022-04-06 VITALS
DIASTOLIC BLOOD PRESSURE: 76 MMHG | SYSTOLIC BLOOD PRESSURE: 144 MMHG | HEIGHT: 70 IN | BODY MASS INDEX: 28.35 KG/M2 | WEIGHT: 198 LBS

## 2022-04-06 DIAGNOSIS — R07.9 CHEST PAIN, UNSPECIFIED TYPE: ICD-10-CM

## 2022-04-06 LAB
STRESS BASELINE DIAS BP: 76 MMHG
STRESS BASELINE HR: 54 BPM
STRESS BASELINE SYS BP: 144 MMHG
STRESS ESTIMATED WORKLOAD: 12.8 METS
STRESS EXERCISE DUR MIN: 10 MIN
STRESS EXERCISE DUR SEC: 37 SEC
STRESS PEAK DIAS BP: 76 MMHG
STRESS PEAK SYS BP: 173 MMHG
STRESS PERCENT HR ACHIEVED: 99 %
STRESS POST PEAK HR: 153 BPM
STRESS RATE PRESSURE PRODUCT: NORMAL BPM*MMHG
STRESS STAGE 1 BP: NORMAL MMHG
STRESS STAGE 1 DURATION: 3 MIN:SEC
STRESS STAGE 1 HR: 94 BPM
STRESS STAGE 2 DURATION: 3 MIN:SEC
STRESS STAGE 2 HR: 108 BPM
STRESS STAGE 3 BP: NORMAL MMHG
STRESS STAGE 3 DURATION: 3 MIN:SEC
STRESS STAGE 3 HR: 129 BPM
STRESS STAGE 4 DURATION: NORMAL MIN:SEC
STRESS STAGE 4 HR: 153 BPM
STRESS STAGE RECOVERY 1 BP: NORMAL MMHG
STRESS STAGE RECOVERY 1 DURATION: 1 MIN:SEC
STRESS STAGE RECOVERY 1 HR: 106 BPM
STRESS STAGE RECOVERY 2 DURATION: 1 MIN:SEC
STRESS STAGE RECOVERY 2 HR: 77 BPM
STRESS STAGE RECOVERY 3 BP: NORMAL MMHG
STRESS STAGE RECOVERY 3 DURATION: 1 MIN:SEC
STRESS STAGE RECOVERY 3 HR: 67 BPM
STRESS STAGE RECOVERY 4 DURATION: 1 MIN:SEC
STRESS STAGE RECOVERY 4 HR: 77 BPM
STRESS TARGET HR: 154 BPM

## 2022-04-06 PROCEDURE — 78452 HT MUSCLE IMAGE SPECT MULT: CPT

## 2022-04-06 PROCEDURE — 93017 CV STRESS TEST TRACING ONLY: CPT

## 2022-04-06 RX ORDER — TETRAKIS(2-METHOXYISOBUTYLISOCYANIDE)COPPER(I) TETRAFLUOROBORATE 1 MG/ML
10.9 INJECTION, POWDER, LYOPHILIZED, FOR SOLUTION INTRAVENOUS
Status: COMPLETED | OUTPATIENT
Start: 2022-04-06 | End: 2022-04-06

## 2022-04-06 RX ORDER — TETRAKIS(2-METHOXYISOBUTYLISOCYANIDE)COPPER(I) TETRAFLUOROBORATE 1 MG/ML
31.7 INJECTION, POWDER, LYOPHILIZED, FOR SOLUTION INTRAVENOUS
Status: COMPLETED | OUTPATIENT
Start: 2022-04-06 | End: 2022-04-06

## 2022-04-06 RX ADMIN — TETRAKIS(2-METHOXYISOBUTYLISOCYANIDE)COPPER(I) TETRAFLUOROBORATE 10.9 MILLICURIE: 1 INJECTION, POWDER, LYOPHILIZED, FOR SOLUTION INTRAVENOUS at 08:30

## 2022-04-06 RX ADMIN — TETRAKIS(2-METHOXYISOBUTYLISOCYANIDE)COPPER(I) TETRAFLUOROBORATE 31.7 MILLICURIE: 1 INJECTION, POWDER, LYOPHILIZED, FOR SOLUTION INTRAVENOUS at 10:20

## 2022-08-17 PROBLEM — I10 PRIMARY HYPERTENSION: Status: ACTIVE | Noted: 2022-08-17

## 2022-08-17 PROBLEM — D69.2 SENILE PURPURA (HCC): Status: ACTIVE | Noted: 2022-08-17

## 2022-09-23 ENCOUNTER — HOSPITAL ENCOUNTER (OUTPATIENT)
Dept: GENERAL RADIOLOGY | Age: 67
Discharge: HOME | End: 2022-09-23
Payer: MEDICARE

## 2022-09-23 ENCOUNTER — TRANSCRIBE ORDER (OUTPATIENT)
Dept: REGISTRATION | Age: 67
End: 2022-09-23

## 2022-09-23 DIAGNOSIS — R05.3 CHRONIC COUGH: Primary | ICD-10-CM

## 2022-09-23 DIAGNOSIS — R05.3 CHRONIC COUGH: ICD-10-CM

## 2022-09-23 PROCEDURE — 71046 X-RAY EXAM CHEST 2 VIEWS: CPT

## 2023-01-06 ENCOUNTER — HOSPITAL ENCOUNTER (OUTPATIENT)
Dept: LAB | Age: 68
Discharge: HOME | End: 2023-01-06

## 2023-03-29 PROBLEM — J44.9 CHRONIC OBSTRUCTIVE PULMONARY DISEASE, UNSPECIFIED (HCC): Status: RESOLVED | Noted: 2023-03-29 | Resolved: 2023-03-29

## 2023-03-29 PROBLEM — F32.5 MAJOR DEPRESSIVE DISORDER WITH SINGLE EPISODE, IN FULL REMISSION (HCC): Status: ACTIVE | Noted: 2023-03-29

## 2023-03-29 PROBLEM — J44.9 CHRONIC OBSTRUCTIVE PULMONARY DISEASE, UNSPECIFIED (HCC): Status: ACTIVE | Noted: 2023-03-29

## 2023-03-29 PROBLEM — J44.9 COPD (CHRONIC OBSTRUCTIVE PULMONARY DISEASE) (HCC): Status: ACTIVE | Noted: 2023-03-29

## 2023-04-07 ENCOUNTER — HOSPITAL ENCOUNTER (OUTPATIENT)
Dept: CT IMAGING | Age: 68
End: 2023-04-07
Attending: INTERNAL MEDICINE
Payer: MEDICARE

## 2023-08-18 ENCOUNTER — HOSPITAL ENCOUNTER (OUTPATIENT)
Facility: HOSPITAL | Age: 68
End: 2023-08-18
Attending: INTERNAL MEDICINE
Payer: MEDICARE

## 2023-08-18 DIAGNOSIS — M54.50 LOW BACK PAIN, UNSPECIFIED BACK PAIN LATERALITY, UNSPECIFIED CHRONICITY, UNSPECIFIED WHETHER SCIATICA PRESENT: ICD-10-CM

## 2023-08-18 PROCEDURE — 72148 MRI LUMBAR SPINE W/O DYE: CPT

## 2023-08-21 NOTE — RESULT ENCOUNTER NOTE
Satish Dowd -- The MRI does not look too bad. If he is still having pain, then he should see me. Thanks.

## 2023-12-06 PROBLEM — Z79.899 ENCOUNTER FOR LONG-TERM (CURRENT) USE OF MEDICATIONS: Status: ACTIVE | Noted: 2021-03-01

## 2024-04-22 ENCOUNTER — HOSPITAL ENCOUNTER (OUTPATIENT)
Facility: HOSPITAL | Age: 69
Discharge: HOME | End: 2024-04-25
Attending: INTERNAL MEDICINE
Payer: MEDICARE

## 2024-04-22 DIAGNOSIS — F17.210 CIGARETTE NICOTINE DEPENDENCE WITHOUT COMPLICATION: ICD-10-CM

## 2024-04-22 PROCEDURE — 71271 CT THORAX LUNG CANCER SCR C-: CPT

## 2024-04-24 PROBLEM — F98.8 ATTENTION DEFICIT DISORDER (ADD) WITHOUT HYPERACTIVITY: Status: ACTIVE | Noted: 2024-04-24
